# Patient Record
Sex: FEMALE | Race: WHITE | NOT HISPANIC OR LATINO | Employment: OTHER | URBAN - METROPOLITAN AREA
[De-identification: names, ages, dates, MRNs, and addresses within clinical notes are randomized per-mention and may not be internally consistent; named-entity substitution may affect disease eponyms.]

---

## 2022-03-16 ENCOUNTER — OFFICE VISIT (OUTPATIENT)
Dept: FAMILY MEDICINE CLINIC | Facility: CLINIC | Age: 27
End: 2022-03-16

## 2022-03-16 ENCOUNTER — PATIENT ROUNDING (BHMG ONLY) (OUTPATIENT)
Dept: FAMILY MEDICINE CLINIC | Facility: CLINIC | Age: 27
End: 2022-03-16

## 2022-03-16 ENCOUNTER — LAB (OUTPATIENT)
Dept: LAB | Facility: HOSPITAL | Age: 27
End: 2022-03-16

## 2022-03-16 VITALS
HEART RATE: 71 BPM | SYSTOLIC BLOOD PRESSURE: 126 MMHG | BODY MASS INDEX: 31.5 KG/M2 | DIASTOLIC BLOOD PRESSURE: 84 MMHG | WEIGHT: 220 LBS | OXYGEN SATURATION: 98 % | HEIGHT: 70 IN

## 2022-03-16 DIAGNOSIS — Z00.00 ENCOUNTER FOR MEDICAL EXAMINATION TO ESTABLISH CARE: ICD-10-CM

## 2022-03-16 DIAGNOSIS — G43.109 MIGRAINE WITH AURA AND WITHOUT STATUS MIGRAINOSUS, NOT INTRACTABLE: ICD-10-CM

## 2022-03-16 DIAGNOSIS — F90.9 ATTENTION DEFICIT HYPERACTIVITY DISORDER (ADHD), UNSPECIFIED ADHD TYPE: ICD-10-CM

## 2022-03-16 DIAGNOSIS — E66.9 OBESITY WITHOUT SERIOUS COMORBIDITY, UNSPECIFIED CLASSIFICATION, UNSPECIFIED OBESITY TYPE: ICD-10-CM

## 2022-03-16 DIAGNOSIS — F41.8 MIXED ANXIETY AND DEPRESSIVE DISORDER: ICD-10-CM

## 2022-03-16 DIAGNOSIS — Z87.42 HISTORY OF ABNORMAL CERVICAL PAP SMEAR: ICD-10-CM

## 2022-03-16 DIAGNOSIS — G95.0 SYRINGOHYDROMYELIA: ICD-10-CM

## 2022-03-16 DIAGNOSIS — Z00.00 ENCOUNTER FOR MEDICAL EXAMINATION TO ESTABLISH CARE: Primary | ICD-10-CM

## 2022-03-16 DIAGNOSIS — R35.0 URINARY FREQUENCY: ICD-10-CM

## 2022-03-16 PROBLEM — M51.24 HERNIATED THORACIC DISC WITHOUT MYELOPATHY: Status: ACTIVE | Noted: 2020-10-28

## 2022-03-16 PROBLEM — G43.909 MIGRAINE HEADACHE: Status: ACTIVE | Noted: 2017-12-01

## 2022-03-16 LAB
ALBUMIN SERPL-MCNC: 4.6 G/DL (ref 3.5–5.2)
ALBUMIN/GLOB SERPL: 1.7 G/DL
ALP SERPL-CCNC: 98 U/L (ref 39–117)
ALT SERPL W P-5'-P-CCNC: 31 U/L (ref 1–33)
ANION GAP SERPL CALCULATED.3IONS-SCNC: 11.1 MMOL/L (ref 5–15)
AST SERPL-CCNC: 25 U/L (ref 1–32)
BILIRUB SERPL-MCNC: 0.5 MG/DL (ref 0–1.2)
BUN SERPL-MCNC: 12 MG/DL (ref 6–20)
BUN/CREAT SERPL: 12.5 (ref 7–25)
CALCIUM SPEC-SCNC: 10 MG/DL (ref 8.6–10.5)
CHLORIDE SERPL-SCNC: 106 MMOL/L (ref 98–107)
CHOLEST SERPL-MCNC: 229 MG/DL (ref 0–200)
CO2 SERPL-SCNC: 18.9 MMOL/L (ref 22–29)
CREAT SERPL-MCNC: 0.96 MG/DL (ref 0.57–1)
DEPRECATED RDW RBC AUTO: 39 FL (ref 37–54)
EGFRCR SERPLBLD CKD-EPI 2021: 83.9 ML/MIN/1.73
ERYTHROCYTE [DISTWIDTH] IN BLOOD BY AUTOMATED COUNT: 12.1 % (ref 12.3–15.4)
GLOBULIN UR ELPH-MCNC: 2.7 GM/DL
GLUCOSE SERPL-MCNC: 79 MG/DL (ref 65–99)
HBA1C MFR BLD: 5.3 % (ref 4.8–5.6)
HCT VFR BLD AUTO: 47.3 % (ref 34–46.6)
HDLC SERPL QL: 5.2
HDLC SERPL-MCNC: 44 MG/DL (ref 40–60)
HGB BLD-MCNC: 15.6 G/DL (ref 12–15.9)
LDLC SERPL CALC-MCNC: 158 MG/DL (ref 0–100)
MCH RBC QN AUTO: 29.3 PG (ref 26.6–33)
MCHC RBC AUTO-ENTMCNC: 33 G/DL (ref 31.5–35.7)
MCV RBC AUTO: 88.9 FL (ref 79–97)
PLATELET # BLD AUTO: 428 10*3/MM3 (ref 140–450)
PMV BLD AUTO: 10.3 FL (ref 6–12)
POTASSIUM SERPL-SCNC: 4.7 MMOL/L (ref 3.5–5.2)
PROT SERPL-MCNC: 7.3 G/DL (ref 6–8.5)
RBC # BLD AUTO: 5.32 10*6/MM3 (ref 3.77–5.28)
SODIUM SERPL-SCNC: 136 MMOL/L (ref 136–145)
TRIGL SERPL-MCNC: 146 MG/DL (ref 0–150)
TSH SERPL DL<=0.05 MIU/L-ACNC: 1.57 UIU/ML (ref 0.27–4.2)
VLDLC SERPL-MCNC: 27 MG/DL (ref 5–40)
WBC NRBC COR # BLD: 11.94 10*3/MM3 (ref 3.4–10.8)

## 2022-03-16 PROCEDURE — 83036 HEMOGLOBIN GLYCOSYLATED A1C: CPT

## 2022-03-16 PROCEDURE — 99204 OFFICE O/P NEW MOD 45 MIN: CPT | Performed by: STUDENT IN AN ORGANIZED HEALTH CARE EDUCATION/TRAINING PROGRAM

## 2022-03-16 PROCEDURE — 36415 COLL VENOUS BLD VENIPUNCTURE: CPT

## 2022-03-16 PROCEDURE — 80050 GENERAL HEALTH PANEL: CPT

## 2022-03-16 PROCEDURE — 80061 LIPID PANEL: CPT

## 2022-03-16 RX ORDER — PROPRANOLOL HYDROCHLORIDE 10 MG/1
10 TABLET ORAL AS NEEDED
COMMUNITY
End: 2022-04-26

## 2022-03-16 RX ORDER — ESCITALOPRAM OXALATE 10 MG/1
10 TABLET ORAL DAILY
COMMUNITY
Start: 2022-02-17 | End: 2022-04-26

## 2022-03-16 RX ORDER — TOPIRAMATE 25 MG/1
25 TABLET ORAL DAILY
Qty: 30 TABLET | Refills: 2 | Status: SHIPPED | OUTPATIENT
Start: 2022-03-16 | End: 2022-04-26

## 2022-03-16 RX ORDER — BUPROPION HYDROCHLORIDE 300 MG/1
300 TABLET ORAL DAILY
COMMUNITY
Start: 2022-02-21 | End: 2022-04-26

## 2022-03-16 RX ORDER — METHYLPHENIDATE HYDROCHLORIDE 30 MG/1
30 CAPSULE, EXTENDED RELEASE ORAL DAILY
COMMUNITY
Start: 2022-02-16 | End: 2022-08-03

## 2022-03-16 RX ORDER — METHYLPHENIDATE HYDROCHLORIDE 20 MG/1
20 TABLET ORAL
COMMUNITY
End: 2022-04-26

## 2022-03-16 RX ORDER — TOPIRAMATE 50 MG/1
50 TABLET, FILM COATED ORAL 2 TIMES DAILY
COMMUNITY
Start: 2022-03-11 | End: 2022-04-26

## 2022-03-16 RX ORDER — FLUOXETINE HYDROCHLORIDE 40 MG/1
CAPSULE ORAL
COMMUNITY
End: 2022-03-16

## 2022-03-16 NOTE — PROGRESS NOTES
New Patient Office Visit      Patient Name: Kathleen Luers  : 1995   MRN: [id number]   Care Team: Patient Care Team:  Jimena Desai DO as PCP - General (Internal Medicine)    Chief Complaint:    Chief Complaint   Patient presents with   • Establish Care   • Migraine   • Urinary Frequency     Last couple nights    • Weight Gain     Gained 17 pounds in the last couple weeks       History of Present Illness: Kathleen Luers is a 26 y.o. female who is here today to establish care. She has history of ADHD, anxiety, depression, headaches, obesity.     ADHD - Follows with [facility], currently taking Ritalin 30mg daily and feels symptoms are well controlled    Urinary frequency - ongoing for about 6 months, notices more often at night time. Has had pregnancy test, UA, and cultures within the past week that have been normal at outside facilities.  Denies dysuria, changes in urine color, urine odor, nausea, vomiting, suprapubic discomfort.  She does admit to occasional urinary hesitancy and difficulty emptying at times.    Syringohydromyelia - diagnosed about 1 year ago, followed with  Neurology yearly. Asymptomatic.     Obesity - has struggled with weight gain over the past several years, seems to be worsening. Exercises regularly, 3x weekly and rides horses. She has been trying to make healthy choices, cooking at home, cutting out soda.     Migraines - ongoing since childhood. Currently taking Topiramate, has been on this for about 1 year. Currently has 2-3 migraines per month and feels her frequency is starting to increase.  Wants to increase topiramate..  Migraines alway start in posterior right sided and travels behind eye. Admits to aura, blurry vision, nausea, vomiting. NSAIDs and hydration improve episodes.     Anxiety/Depression - Wellbutrin, Lexapro, as needed propranolol, managed per behavioral health. Very happy with current mental health. Feels symptoms are very well controlled.      History of abnormal pap (LSIL 05/2021) - due for repeat in 05/2022    Family History  Maternal grandfather - colorectal cancer >50, lung cancer, brain cancer   Paternal Uncle - colorectal age 34  Mother - HTN  Father - heart murmur       Subjective      Review of Systems:   Review of Systems - See HPI    Past Medical History:   Past Medical History:   Diagnosis Date   • ADHD (attention deficit hyperactivity disorder)    • Anxiety    • Depression    • Headache    • Obesity        Past Surgical History:   Past Surgical History:   Procedure Laterality Date   • WISDOM TOOTH EXTRACTION         Family History:   Family History   Problem Relation Age of Onset   • Anxiety disorder Mother    • Asthma Mother    • Depression Mother    • Mental illness Mother    • Miscarriages / Stillbirths Mother    • Arthritis Maternal Grandfather    • Cancer Paternal Grandfather    • Heart disease Paternal Grandfather    • Hyperlipidemia Paternal Grandfather    • Hypertension Paternal Grandfather    • Alcohol abuse Maternal Aunt    • Drug abuse Maternal Aunt    • Cancer Paternal Uncle    • Cancer Maternal Aunt        Social History:   Social History     Socioeconomic History   • Marital status: Single   Tobacco Use   • Smoking status: Never Smoker   • Smokeless tobacco: Never Used   Substance and Sexual Activity   • Alcohol use: Yes     Comment: Very occasionally. Not weekly   • Drug use: Never   • Sexual activity: Yes     Partners: Male     Birth control/protection: Injection       Tobacco History:   Social History     Tobacco Use   Smoking Status Never Smoker   Smokeless Tobacco Never Used       Medications:     Current Outpatient Medications:   •  buPROPion XL (WELLBUTRIN XL) 300 MG 24 hr tablet, Take 300 mg by mouth Daily., Disp: , Rfl:   •  escitalopram (LEXAPRO) 10 MG tablet, Take 10 mg by mouth Daily., Disp: , Rfl:   •  methylphenidate (RITALIN) 20 MG tablet, Take 20 mg by mouth. 1 tablet in the afternoon, Disp: , Rfl:   •   "methylphenidate LA (RITALIN LA) 30 MG 24 hr capsule, Take 30 mg by mouth Daily, Disp: , Rfl:   •  propranolol (INDERAL) 10 MG tablet, Take 10 mg by mouth As Needed., Disp: , Rfl:   •  topiramate (TOPAMAX) 50 MG tablet, Take 50 mg by mouth 2 (Two) Times a Day., Disp: , Rfl:   •  topiramate (TOPAMAX) 25 MG tablet, Take 1 tablet by mouth Daily. To be taken in addition to topiramate 50 mg twice daily for for total dose of 75 mg in the morning and 50 mg in the evening., Disp: 30 tablet, Rfl: 2    Allergies:   No Known Allergies    Objective     Physical Exam:  Vital Signs:   Vitals:    03/16/22 1010   BP: 126/84   Pulse: 71   SpO2: 98%   Weight: 99.8 kg (220 lb)   Height: 177.8 cm (70\")     Body mass index is 31.57 kg/m².     Physical Exam  Vitals reviewed.   Constitutional:       Appearance: Normal appearance. She is obese.   Neck:      Comments: Thyroid is normal size, no nodules or tenderness  Cardiovascular:      Rate and Rhythm: Normal rate and regular rhythm.      Pulses: Normal pulses.      Heart sounds: Normal heart sounds. No murmur heard.  Pulmonary:      Effort: Pulmonary effort is normal. No respiratory distress.      Breath sounds: Normal breath sounds. No wheezing.   Abdominal:      General: There is no distension.      Palpations: Abdomen is soft.      Tenderness: There is no abdominal tenderness.   Skin:     General: Skin is warm and dry.   Neurological:      Mental Status: She is alert.   Psychiatric:         Mood and Affect: Mood normal.         Behavior: Behavior normal.         Judgment: Judgment normal.         Assessment / Plan      Assessment/Plan:   Problems Addressed This Visit  Diagnoses and all orders for this visit:    1. Encounter for medical examination to establish care (Primary)  -     TSH Rfx On Abnormal To Free T4; Future  -     Comprehensive metabolic panel; Future  -     Hemoglobin A1c; Future  -     Lipid Panel w/ Chol/HDL Ratio; Future  -     CBC No Differential; Future    Has " history of abnormal Pap smears in May 2021, due for repeat in May 2022.      2. Mixed anxiety and depressive disorder  -     TSH Rfx On Abnormal To Free T4; Future  -     Comprehensive metabolic panel; Future  -     Lipid Panel w/ Chol/HDL Ratio; Future  -     CBC No Differential; Future    Follow with behavioral health.  Well-controlled with current Lexapro and Wellbutrin, managed per behavioral health    3. Urinary frequency  -     POC Urinalysis Dipstick, Automated  -     Ambulatory Referral to Urology    Ongoing for several months.  She presents with recurrence of normal UA and cultures from previous providers over the past months.  She request referral to urology, placed today.  We also discussed the importance of limiting nighttime fluid intake, caffeine.    4. Obesity without serious comorbidity, unspecified classification, unspecified obesity type  -     TSH Rfx On Abnormal To Free T4; Future  -     Comprehensive metabolic panel; Future  -     Hemoglobin A1c; Future  -     Lipid Panel w/ Chol/HDL Ratio; Future  -     CBC No Differential; Future    Will obtain labs to evaluate for metabolic dysfunction.  Counseled patient on importance of weight loss and maintaining healthy lifestyle. Recommended calorie deficit and 150 minutes of exercise per week.  Check discussed trial of intermittent fasting.  Recommended using my fitness pal for calorie counting.    5. Migraine with aura and without status migrainosus, not intractable  -     topiramate (TOPAMAX) 25 MG tablet; Take 1 tablet by mouth Daily. To be taken in addition to topiramate 50 mg twice daily for for total dose of 75 mg in the morning and 50 mg in the evening.  Dispense: 30 tablet; Refill: 2    Seems to be worsening in frequency.  Will increase topiramate to 75 mg in the morning and 50 mg in the evening.  Counseled on importance of hydration, healthy sleep hygiene habits, and optimization of mental health.    6. Attention deficit hyperactivity disorder  (ADHD), unspecified ADHD type  Will follow with behavioral health.  Well-controlled with Ritalin 30 mg daily    7. Syringohydromyelia (HCC)  Asymptomatic.  Follows with [facility] annually        Plan of care reviewed with patient at the conclusion of today's visit. Education was provided regarding diagnosis and management.  Patient verbalizes understanding of and agreement with management plan.      Follow Up:   Return for 4-6 weeks for weight , Recheck.          DO LASHANDA Oneil RD  [facility]  [address]  formerly [facility] [phone]  Fax [phone]  Phone [phone]

## 2022-03-28 ENCOUNTER — PATIENT MESSAGE (OUTPATIENT)
Dept: FAMILY MEDICINE CLINIC | Facility: CLINIC | Age: 27
End: 2022-03-28

## 2022-03-28 DIAGNOSIS — R35.0 URINARY FREQUENCY: Primary | ICD-10-CM

## 2022-03-29 RX ORDER — OXYBUTYNIN CHLORIDE 5 MG/1
5 TABLET, EXTENDED RELEASE ORAL DAILY
Qty: 30 TABLET | Refills: 0 | Status: SHIPPED | OUTPATIENT
Start: 2022-03-29 | End: 2022-04-26

## 2022-04-26 ENCOUNTER — LAB (OUTPATIENT)
Dept: LAB | Facility: HOSPITAL | Age: 27
End: 2022-04-26

## 2022-04-26 ENCOUNTER — OFFICE VISIT (OUTPATIENT)
Dept: UROLOGY | Facility: CLINIC | Age: 27
End: 2022-04-26

## 2022-04-26 VITALS — WEIGHT: 220 LBS | HEIGHT: 70 IN | BODY MASS INDEX: 31.5 KG/M2

## 2022-04-26 DIAGNOSIS — N32.81 OVERACTIVE BLADDER: ICD-10-CM

## 2022-04-26 DIAGNOSIS — R35.0 FREQUENCY OF URINATION: Primary | ICD-10-CM

## 2022-04-26 DIAGNOSIS — R35.0 FREQUENCY OF URINATION: ICD-10-CM

## 2022-04-26 LAB
BILIRUB BLD-MCNC: NEGATIVE MG/DL
CLARITY, POC: CLEAR
COLOR UR: YELLOW
EXPIRATION DATE: NORMAL
GLUCOSE UR STRIP-MCNC: NEGATIVE MG/DL
KETONES UR QL: NEGATIVE
LEUKOCYTE EST, POC: NEGATIVE
Lab: NORMAL
NITRITE UR-MCNC: NEGATIVE MG/ML
PH UR: 5.5 [PH] (ref 5–8)
PROT UR STRIP-MCNC: NEGATIVE MG/DL
RBC # UR STRIP: NEGATIVE /UL
SP GR UR: 1.01 (ref 1–1.03)
UROBILINOGEN UR QL: NORMAL

## 2022-04-26 PROCEDURE — 81003 URINALYSIS AUTO W/O SCOPE: CPT | Performed by: STUDENT IN AN ORGANIZED HEALTH CARE EDUCATION/TRAINING PROGRAM

## 2022-04-26 PROCEDURE — 99204 OFFICE O/P NEW MOD 45 MIN: CPT | Performed by: STUDENT IN AN ORGANIZED HEALTH CARE EDUCATION/TRAINING PROGRAM

## 2022-04-26 PROCEDURE — 87109 MYCOPLASMA: CPT

## 2022-04-26 PROCEDURE — 51798 US URINE CAPACITY MEASURE: CPT | Performed by: STUDENT IN AN ORGANIZED HEALTH CARE EDUCATION/TRAINING PROGRAM

## 2022-04-26 NOTE — PROGRESS NOTES
[facility] Female Office Visit      Patient Name: Kathleen Luers  : 1995   MRN: [id number]     Chief Complaint:  Lower Urinary Tract Symptoms.   Chief Complaint   Patient presents with   • Frequency   • New Patient        Referring Provider: Jimena Desai DO    History of Present Illness: Mr. Luers is a 26 y.o. female with history lower urinary tract symptoms presents to [facility] care. Patient has syringohydromyelia  from T3-T12. Has a history of sciatica in her left leg that was recurrent, and eventually spinal MRI demonstrated this finding, likely something from birth or related to fall from horses (patient rides horses).  Patient also has a history of anxiety and ADHD managed with Ritalin, Lexapro and Wellbutrin.    Has had piriformis injections in the past for L sided sciatica, numbness and burning throughout left leg.     Primary symptom of bother is urgency and frequency.  She states she will often urinate, and feel like she needs to run right back to the bathroom.  She states she has a preserved strength of urinary stream.  She states this is made worse with intercourse.    Patient is reporting nocturia 1-2 times a night.    1 cup of coffee in AM, drinks water throughout day. Cuts off water off at 6-7 PM to prevent nocturia.     Denies gross hematuria.  Denies nephrolithiasis.  No significant urinary tract infection history.    Patient is sexually active and is mongamous.  No prior sexual transmitted infections.    PVR 0 mL today.  Urinalysis negative today.    She has previously been prescribed 5 mg as well as 10 mg oxybutynin extended release tablets with minimal improvement in her symptoms.      Subjective      Review of System: Review of Systems   Constitutional: Negative.  Negative for chills, fatigue, fever and unexpected weight change.   HENT: Negative.  Negative for sore throat.    Eyes: Negative.  Negative for visual disturbance.   Respiratory: Negative.  Negative for cough, chest  tightness and shortness of breath.    Cardiovascular: Negative.  Negative for chest pain and leg swelling.   Gastrointestinal: Negative.  Negative for blood in stool, constipation, diarrhea, nausea, rectal pain and vomiting.   Genitourinary: Negative.  Negative for decreased urine volume, difficulty urinating, dysuria, enuresis, flank pain, frequency, genital sores, hematuria and urgency.   Musculoskeletal: Negative.  Negative for back pain and joint swelling.   Skin: Negative.  Negative for rash and wound.   Neurological: Negative.  Negative for seizures, speech difficulty, weakness and headaches.   Psychiatric/Behavioral: Negative.  Negative for confusion, sleep disturbance and suicidal ideas. The patient is not nervous/anxious.       I have reviewed the ROS documented by my clinical staff, I have updated appropriately and I agree. Leonard Nicole MD    Past Medical History:  Past Medical History:   Diagnosis Date   • ADHD (attention deficit hyperactivity disorder)    • Anxiety    • Depression    • Headache    • Obesity        Past Surgical History:  Past Surgical History:   Procedure Laterality Date   • WISDOM TOOTH EXTRACTION         Medications:    Current Outpatient Medications:   •  buPROPion XL (WELLBUTRIN XL) 300 MG 24 hr tablet, Take 1 tablet by mouth Daily., Disp: 30 tablet, Rfl: 5  •  escitalopram (LEXAPRO) 10 MG tablet, Take 1 tablet by mouth Daily., Disp: 30 tablet, Rfl: 5  •  methylphenidate (Ritalin) 20 MG tablet, Take 1 tablet by mouth Daily., Disp: 30 tablet, Rfl: 0  •  methylphenidate LA (RITALIN LA) 30 MG 24 hr capsule, Take 30 mg by mouth Daily, Disp: , Rfl:   •  Mirabegron ER (MYRBETRIQ) 50 MG tablet sustained-release 24 hour 24 hr tablet, Take 50 mg by mouth Daily., Disp: 30 tablet, Rfl: 0  •  propranolol (INDERAL) 10 MG tablet, Take 1 tablet by mouth 3 (Three) Times a Day As Needed., Disp: 90 tablet, Rfl: 5  •  topiramate (TOPAMAX) 50 MG tablet, Take 50 mg by mouth 2 (Two) Times a Day.,  "Disp: , Rfl:     Allergies:  No Known Allergies    Social History:  Social History     Socioeconomic History   • Marital status: Single   Tobacco Use   • Smoking status: Never Smoker   • Smokeless tobacco: Never Used   Substance and Sexual Activity   • Alcohol use: Yes     Comment: Very occasionally. Not weekly   • Drug use: Never   • Sexual activity: Yes     Partners: Male     Birth control/protection: Injection       Family History:  Family History   Problem Relation Age of Onset   • Anxiety disorder Mother    • Asthma Mother    • Depression Mother    • Mental illness Mother    • Miscarriages / Stillbirths Mother    • Arthritis Maternal Grandfather    • Cancer Paternal Grandfather    • Heart disease Paternal Grandfather    • Hyperlipidemia Paternal Grandfather    • Hypertension Paternal Grandfather    • Alcohol abuse Maternal Aunt    • Drug abuse Maternal Aunt    • Cancer Paternal Uncle    • Cancer Maternal Aunt          Post void residual bladder scan:    00mL     Objective     Physical Exam:   Vital Signs:   Vitals:    04/26/22 0951   Weight: 99.8 kg (220 lb)   Height: 177.8 cm (70\")   PainSc: 0-No pain     Body mass index is 31.57 kg/m².     Physical Exam  Vitals and nursing note reviewed. Exam conducted with a chaperone present.   Constitutional:       Appearance: Normal appearance.   HENT:      Head: Normocephalic and atraumatic.      Mouth/Throat:      Mouth: Mucous membranes are moist.      Pharynx: Oropharynx is clear.   Eyes:      Extraocular Movements: Extraocular movements intact.      Conjunctiva/sclera: Conjunctivae normal.   Cardiovascular:      Rate and Rhythm: Normal rate and regular rhythm.   Pulmonary:      Effort: Pulmonary effort is normal. No respiratory distress.   Abdominal:      Palpations: Abdomen is soft.      Tenderness: There is no abdominal tenderness. There is no right CVA tenderness or left CVA tenderness.   Musculoskeletal:         General: Normal range of motion.      Cervical " back: Normal range of motion.   Skin:     General: Skin is warm and dry.   Neurological:      General: No focal deficit present.      Mental Status: She is alert and oriented to person, place, and time.   Psychiatric:         Mood and Affect: Mood normal.         Behavior: Behavior normal.         Labs:   Brief Urine Lab Results  (Last result in the past 365 days)      Color   Clarity   Blood   Leuk Est   Nitrite   Protein   CREAT   Urine HCG        04/26/22 0954 Yellow   Clear   Negative   Negative   Negative   Negative                      Lab Results   Component Value Date    GLUCOSE 79 03/16/2022    CALCIUM 10.0 03/16/2022     03/16/2022    K 4.7 03/16/2022    CO2 18.9 (L) 03/16/2022     03/16/2022    BUN 12 03/16/2022    CREATININE 0.96 03/16/2022    BCR 12.5 03/16/2022    ANIONGAP 11.1 03/16/2022       Lab Results   Component Value Date    WBC 11.94 (H) 03/16/2022    HGB 15.6 03/16/2022    HCT 47.3 (H) 03/16/2022    MCV 88.9 03/16/2022     03/16/2022       Images:   No Images in the past 120 days found..    Measures:   Tobacco:   Kathleen Luers  reports that she has never smoked. She has never used smokeless tobacco..          Urine Incontinence: (NOUI)  Patient reports that she is not currently experiencing any symptoms of urinary incontinence.      Assessment / Plan      Assessment:  Mrs. Luers is a 26 y.o. female who presented today with lower urinary tract symptoms.  Primary bother is urgency, frequency and sensation of incomplete emptying.  This is consistent with overactive bladder.  Discussed there is a chance that her thoracic spinal findings including likely syringomyelia seal could be contributing as well as her history of sciatica.  She has very few bladder irritating substances in her diet.  We will go ahead and treat her with medications.  She has previously tried and failed 10 mg oxybutynin extended release tablet.  We will start her on mirabegron 50 mg tablets daily.  She will  see me back in 3 months for reassessment.  I gave her a month of samples today, she will reach out to me if she would like to continue this medication, we discussed that this could be possibly more expensive than anticholinergics however has fewer side effects including no dry, no dry mouth or risk of constipation.  Discussed the small increased risk of hypertension related to this medication.    We will send her urine off for mycoplasma and Ureaplasma to make sure she does not have an atypical organism driving her urinary symptoms.    Diagnoses and all orders for this visit:    1. Frequency of urination (Primary)  -     POC Urinalysis Dipstick, Automated  -     Mycoplasma / Ureaplasma Culture - Urine, Urine, Clean Catch; Future    2. Overactive bladder  -     Mycoplasma / Ureaplasma Culture - Urine, Urine, Clean Catch; Future  -     Mirabegron ER (MYRBETRIQ) 50 MG tablet sustained-release 24 hour 24 hr tablet; Take 50 mg by mouth Daily.  Dispense: 30 tablet; Refill: 0           Follow Up:   Return in about 3 months (around 7/26/2022).    I spent approximately 45 minutes providing clinical care for this patient; including review of patient's chart and provider documentation, face to face time spent with patient in examination room (obtaining history, performing physical exam, discussing diagnosis and management options), placing orders, and completing patient documentation.     Leonard Nicole MD  [facility] [place]

## 2022-04-27 ENCOUNTER — PATIENT ROUNDING (BHMG ONLY) (OUTPATIENT)
Dept: UROLOGY | Facility: CLINIC | Age: 27
End: 2022-04-27

## 2022-04-27 NOTE — PROGRESS NOTES
A go2 media message has been sent to the patient for PATIENT ROUNDING with [facility].

## 2022-05-02 ENCOUNTER — TELEPHONE (OUTPATIENT)
Dept: UROLOGY | Facility: CLINIC | Age: 27
End: 2022-05-02

## 2022-05-02 DIAGNOSIS — A49.3 NGU DUE TO UREAPLASMA UREALYTICUM: Primary | ICD-10-CM

## 2022-05-02 DIAGNOSIS — N34.1 NGU DUE TO UREAPLASMA UREALYTICUM: Primary | ICD-10-CM

## 2022-05-02 RX ORDER — DOXYCYCLINE HYCLATE 100 MG/1
100 CAPSULE ORAL 2 TIMES DAILY
Qty: 28 CAPSULE | Refills: 0 | Status: SHIPPED | OUTPATIENT
Start: 2022-05-02 | End: 2022-05-16

## 2022-05-02 NOTE — TELEPHONE ENCOUNTER
I attempted to call the patient back with the results of her urine culture which resulted positive for Ureaplasma.  I was unable to reach the patient x2, left her voicemail indicating that we will treat her with 2-week course of doxycycline 100 mg twice daily to clear her urine as best as possible.  Discussed that mycoplasma/Ureaplasma are typically colonizing bacteria that usually do not cause symptoms but when they are present on culture and symptoms are present, we will attempt to eradicate them from the urinary tract.  I discussed that this is not a sexually transmitted infection.  I asked her to give me a call if she has any further concerns.      Leonard Nicole MD

## 2022-05-02 NOTE — TELEPHONE ENCOUNTER
----- Message from Leonard Nicole MD sent at 5/2/2022  3:29 PM EDT -----  Sophie, I try to call this patient twice and left her voicemail.  Can you try to reach out to her and let her know that her urine was positive for a bacteria called Ureaplasma and I sent her 2 weeks of doxycycline to her pharmacy to treat this.

## 2022-05-04 LAB
M HOMINIS SPEC QL CULT: NEGATIVE
U UREALYTICUM SPEC QL CULT: POSITIVE

## 2022-05-24 DIAGNOSIS — A49.3 NGU DUE TO UREAPLASMA UREALYTICUM: Primary | ICD-10-CM

## 2022-05-24 DIAGNOSIS — N34.1 NGU DUE TO UREAPLASMA UREALYTICUM: Primary | ICD-10-CM

## 2022-05-24 RX ORDER — LEVOFLOXACIN 500 MG/1
500 TABLET, FILM COATED ORAL DAILY
Qty: 7 TABLET | Refills: 0 | Status: SHIPPED | OUTPATIENT
Start: 2022-05-24 | End: 2022-06-01

## 2022-06-04 ENCOUNTER — PATIENT MESSAGE (OUTPATIENT)
Dept: FAMILY MEDICINE CLINIC | Facility: CLINIC | Age: 27
End: 2022-06-04

## 2022-06-04 DIAGNOSIS — G43.109 MIGRAINE WITH AURA AND WITHOUT STATUS MIGRAINOSUS, NOT INTRACTABLE: Primary | ICD-10-CM

## 2022-06-06 RX ORDER — TOPIRAMATE 25 MG/1
75 TABLET ORAL EVERY MORNING
Qty: 90 TABLET | Refills: 5 | Status: SHIPPED | OUTPATIENT
Start: 2022-06-06 | End: 2022-09-30

## 2022-06-06 RX ORDER — TOPIRAMATE 50 MG/1
50 TABLET, FILM COATED ORAL
Qty: 30 TABLET | Refills: 5 | Status: SHIPPED | OUTPATIENT
Start: 2022-06-06 | End: 2022-09-30

## 2022-06-08 ENCOUNTER — PATIENT MESSAGE (OUTPATIENT)
Dept: FAMILY MEDICINE CLINIC | Facility: CLINIC | Age: 27
End: 2022-06-08

## 2022-06-08 ENCOUNTER — CLINICAL SUPPORT (OUTPATIENT)
Dept: FAMILY MEDICINE CLINIC | Facility: CLINIC | Age: 27
End: 2022-06-08

## 2022-06-08 DIAGNOSIS — Z01.419 ROUTINE GYNECOLOGICAL EXAMINATION: Primary | ICD-10-CM

## 2022-06-08 DIAGNOSIS — Z87.42 HISTORY OF ABNORMAL CERVICAL PAP SMEAR: ICD-10-CM

## 2022-06-08 DIAGNOSIS — Z30.42 ENCOUNTER FOR SURVEILLANCE OF INJECTABLE CONTRACEPTIVE: ICD-10-CM

## 2022-06-08 LAB
B-HCG UR QL: NEGATIVE
EXPIRATION DATE: NORMAL
INTERNAL NEGATIVE CONTROL: NEGATIVE
INTERNAL POSITIVE CONTROL: POSITIVE
Lab: NORMAL

## 2022-06-08 PROCEDURE — 81025 URINE PREGNANCY TEST: CPT | Performed by: STUDENT IN AN ORGANIZED HEALTH CARE EDUCATION/TRAINING PROGRAM

## 2022-06-08 RX ORDER — MEDROXYPROGESTERONE ACETATE 150 MG/ML
150 INJECTION, SUSPENSION INTRAMUSCULAR
Qty: 1 ML | Refills: 1 | Status: SHIPPED | OUTPATIENT
Start: 2022-06-08 | End: 2022-12-06 | Stop reason: SDUPTHER

## 2022-06-29 ENCOUNTER — TELEMEDICINE (OUTPATIENT)
Dept: FAMILY MEDICINE CLINIC | Facility: TELEHEALTH | Age: 27
End: 2022-06-29

## 2022-06-29 DIAGNOSIS — J32.9 SINUSITIS, UNSPECIFIED CHRONICITY, UNSPECIFIED LOCATION: Primary | ICD-10-CM

## 2022-06-29 PROCEDURE — 99213 OFFICE O/P EST LOW 20 MIN: CPT | Performed by: NURSE PRACTITIONER

## 2022-06-29 RX ORDER — AMOXICILLIN AND CLAVULANATE POTASSIUM 875; 125 MG/1; MG/1
1 TABLET, FILM COATED ORAL 2 TIMES DAILY
Qty: 20 TABLET | Refills: 0 | Status: SHIPPED | OUTPATIENT
Start: 2022-06-29 | End: 2022-08-03

## 2022-06-29 RX ORDER — METHYLPREDNISOLONE 4 MG/1
TABLET ORAL
Qty: 21 TABLET | Refills: 0 | Status: SHIPPED | OUTPATIENT
Start: 2022-06-29 | End: 2022-08-03

## 2022-06-29 NOTE — PROGRESS NOTES
You have chosen to receive care through a telehealth visit.  Do you consent to use a video/audio connection for your medical care today? Yes     CHIEF COMPLAINT  Chief Complaint   Patient presents with   • Sinus Problem         HPI  Kathleen Luers is a 26 y.o. female  presents with complaint of 7 day h/o sinus congestion and pressure with nasal discharge yellow and thick and bilateral ear pain. She is using Flonase nasal spray and Mucinex without relief. She had Covid 19 the middle of June but these symptoms have lingered and she feels has evolved into a sinus infection.     Review of Systems   Constitutional: Positive for activity change and fatigue. Negative for fever.   HENT: Positive for congestion, ear pain (bilaterally), postnasal drip, rhinorrhea, sinus pressure and sinus pain.    Respiratory: Positive for cough.    Cardiovascular: Negative.    Gastrointestinal: Negative.    Musculoskeletal: Negative.    Skin: Negative.    Neurological: Positive for headaches.   Hematological: Negative.    Psychiatric/Behavioral: Negative.        Past Medical History:   Diagnosis Date   • ADHD (attention deficit hyperactivity disorder)    • Anxiety    • Depression    • Headache    • Obesity        Family History   Problem Relation Age of Onset   • Anxiety disorder Mother    • Asthma Mother    • Depression Mother    • Mental illness Mother    • Miscarriages / Stillbirths Mother    • Arthritis Maternal Grandfather    • Cancer Paternal Grandfather    • Heart disease Paternal Grandfather    • Hyperlipidemia Paternal Grandfather    • Hypertension Paternal Grandfather    • Alcohol abuse Maternal Aunt    • Drug abuse Maternal Aunt    • Cancer Paternal Uncle    • Cancer Maternal Aunt        Social History     Socioeconomic History   • Marital status: Single   Tobacco Use   • Smoking status: Never Smoker   • Smokeless tobacco: Never Used   Substance and Sexual Activity   • Alcohol use: Yes     Comment: Very occasionally. Not weekly    • Drug use: Never   • Sexual activity: Yes     Partners: Male     Birth control/protection: Injection       Kathleen Luers  reports that she has never smoked. She has never used smokeless tobacco..    There were no vitals taken for this visit.    PHYSICAL EXAM  Physical Exam   Constitutional: She is oriented to person, place, and time. She appears well-developed and well-nourished. She does not have a sickly appearance. She does not appear ill. No distress.   HENT:   Head: Normocephalic and atraumatic.   Nose: Mucosal edema and congestion present. Right sinus exhibits maxillary sinus tenderness and frontal sinus tenderness. Left sinus exhibits maxillary sinus tenderness and frontal sinus tenderness. nasal tenderness present.  Pulmonary/Chest: Effort normal.   Neurological: She is alert and oriented to person, place, and time.   Psychiatric: She has a normal mood and affect.   Vitals reviewed.      Results for orders placed or performed in visit on 06/08/22   POCT pregnancy, urine    Specimen: Urine   Result Value Ref Range    HCG, Urine, QL Negative Negative    Lot Number [id number]     Internal Positive Control Positive Positive, Passed    Internal Negative Control Negative Negative, Passed    Expiration Date 07/31/2023        Diagnoses and all orders for this visit:    1. Sinusitis, unspecified chronicity, unspecified location (Primary)  -     amoxicillin-clavulanate (Augmentin) 875-125 MG per tablet; Take 1 tablet by mouth 2 (Two) Times a Day.  Dispense: 20 tablet; Refill: 0  -     methylPREDNISolone (MEDROL) 4 MG dose pack; Take as directed on package instructions.  Dispense: 21 tablet; Refill: 0    Flonase as directed prn nasal congestion.   Sudafed as directed prn sinus congestion.   Mucinex DM as directed prn cough and congestion.   Fluids and rest      FOLLOW-UP  As discussed during visit with PCP/[facility] if no improvement or Urgent Care/Emergency Department if worsening of symptoms    Patient  verbalizes understanding of medication dosage, comfort measures, instructions for treatment and follow-up.    Cassandra Lai, COMFORT  06/29/2022  14:48 EDT    The use of a video visit has been reviewed with the patient and verbal informed consent has been obtained. Myself and Kathleen Luers participated in this visit. The patient is located in [address] [place] [address].    I am located in [place], [place]. Mychart and Zoom were utilized. I spent  15  minutes in the patient's chart for this visit.

## 2022-07-01 ENCOUNTER — OFFICE VISIT (OUTPATIENT)
Dept: FAMILY MEDICINE CLINIC | Facility: CLINIC | Age: 27
End: 2022-07-01

## 2022-07-01 VITALS
HEART RATE: 80 BPM | DIASTOLIC BLOOD PRESSURE: 72 MMHG | BODY MASS INDEX: 31.58 KG/M2 | HEIGHT: 70 IN | OXYGEN SATURATION: 98 % | SYSTOLIC BLOOD PRESSURE: 128 MMHG | WEIGHT: 220.6 LBS

## 2022-07-01 DIAGNOSIS — G47.19 EXCESSIVE DAYTIME SLEEPINESS: ICD-10-CM

## 2022-07-01 DIAGNOSIS — G89.29 CHRONIC BILATERAL THORACIC BACK PAIN: Primary | ICD-10-CM

## 2022-07-01 DIAGNOSIS — M54.2 CERVICALGIA: ICD-10-CM

## 2022-07-01 DIAGNOSIS — M54.6 CHRONIC BILATERAL THORACIC BACK PAIN: Primary | ICD-10-CM

## 2022-07-01 DIAGNOSIS — R06.83 SNORING: ICD-10-CM

## 2022-07-01 DIAGNOSIS — N62 LARGE BREASTS: ICD-10-CM

## 2022-07-01 PROCEDURE — 99214 OFFICE O/P EST MOD 30 MIN: CPT | Performed by: STUDENT IN AN ORGANIZED HEALTH CARE EDUCATION/TRAINING PROGRAM

## 2022-07-01 NOTE — PROGRESS NOTES
Established Office Visit      Patient Name: Kathleen Luers  : 1995   MRN: [id number]   Care Team: Patient Care Team:  Jimena Desai DO as PCP - General (Internal Medicine)    Chief Complaint:    Chief Complaint   Patient presents with   • Breast Reduction     Pt is wanting to discuss breast reduction       History of Present Illness: Kathleen Luers is a 26 y.o. female who is here today to discuss breast reduction, back pain.    She has been struggling with chronic upper back pain due to large breasts for several years. Feels it interferes with her posture and causea chronic upper neck/back pain. She has tried wide strapped bras, NSAIDs without relief. She has participated in physical therapy for upper and lower back without relief. She is interested in reduction.     She also complains of snoring at night, daytime fatigue, frequent day time nap, falls asleep while watching TV/movies. She is interested in sleep study.    Subjective      Review of Systems:   Review of Systems - See HPI    I have reviewed and the following portions of the patient's history were updated as appropriate: past family history, past medical history, past social history, past surgical history and problem list.    Medications:     Current Outpatient Medications:   •  amoxicillin-clavulanate (Augmentin) 875-125 MG per tablet, Take 1 tablet by mouth 2 (Two) Times a Day., Disp: 20 tablet, Rfl: 0  •  buPROPion XL (FORFIVO XL) 450 MG 24 hr tablet, Take 1 tablet by mouth Every Morning., Disp: 30 tablet, Rfl: 6  •  buPROPion XL (Wellbutrin XL) 150 MG 24 hr tablet, Take 1 tablet by mouth Daily with 300mg tablet, Disp: 90 tablet, Rfl: 0  •  buPROPion XL (Wellbutrin XL) 150 MG 24 hr tablet, Take 1 tablet by mouth Daily. (with Wellbutrin XL 300mg QD; total dose 450mg QD), Disp: 90 tablet, Rfl: 0  •  buPROPion XL (WELLBUTRIN XL) 300 MG 24 hr tablet, Take 1 tablet by mouth Daily., Disp: 30 tablet, Rfl: 5  •  escitalopram (LEXAPRO) 10 MG  "tablet, Take 1 tablet by mouth Daily., Disp: 30 tablet, Rfl: 5  •  medroxyPROGESTERone Acetate 150 MG/ML suspension prefilled syringe, Inject 1 mL into the appropriate muscle as directed by prescriber Every 3 (Three) Months., Disp: 1 mL, Rfl: 1  •  methylphenidate (Ritalin) 20 MG tablet, Take 1 tablet by mouth Every Afternoon., Disp: 30 tablet, Rfl: 0  •  methylphenidate (Ritalin) 20 MG tablet, Take 1 oral tablet every afternoon., Disp: 30 tablet, Rfl: 0  •  methylphenidate LA (RITALIN LA) 30 MG 24 hr capsule, Take 30 mg by mouth Daily, Disp: , Rfl:   •  methylphenidate LA (Ritalin LA) 30 MG 24 hr capsule, Take 1 capsule by mouth Every Morning, Disp: 30 capsule, Rfl: 0  •  methylPREDNISolone (MEDROL) 4 MG dose pack, Take as directed on package instructions., Disp: 21 tablet, Rfl: 0  •  ondansetron ODT (ZOFRAN-ODT) 4 MG disintegrating tablet, Place 1 tablet under the tongue Every 6 (Six) Hours As Needed., Disp: 20 tablet, Rfl: 0  •  propranolol (INDERAL) 10 MG tablet, Take 1 tablet by mouth 3 (Three) Times a Day As Needed., Disp: 90 tablet, Rfl: 5  •  Semaglutide,0.25 or 0.5MG/DOS, (Ozempic, 0.25 or 0.5 MG/DOSE,) 2 MG/1.5ML solution pen-injector, Inject 0.25 mg under the skin into the appropriate area as directed 1 (One) Time Per Week., Disp: 1.5 mL, Rfl: 0  •  topiramate (Topamax) 25 MG tablet, Take 3 tablets by mouth Every Morning., Disp: 90 tablet, Rfl: 5  •  topiramate (TOPAMAX) 50 MG tablet, Take 1 tablet by mouth every night at bedtime., Disp: 30 tablet, Rfl: 5    Allergies:   No Known Allergies    Objective     Physical Exam:  Vital Signs:   Vitals:    07/01/22 1506   BP: 128/72   Pulse: 80   SpO2: 98%   Weight: 100 kg (220 lb 9.6 oz)   Height: 177.8 cm (70\")     Body mass index is 31.65 kg/m².     Physical Exam  Vitals reviewed.   Constitutional:       Appearance: Normal appearance.   Cardiovascular:      Rate and Rhythm: Normal rate.   Pulmonary:      Effort: Pulmonary effort is normal. No respiratory " distress.   Chest:      Comments: Large breasts bilaterally   Musculoskeletal:      Cervical back: Normal range of motion and neck supple.   Skin:     General: Skin is warm and dry.   Neurological:      Mental Status: She is alert.   Psychiatric:         Mood and Affect: Mood normal.         Behavior: Behavior normal.         Judgment: Judgment normal.         Assessment / Plan      Assessment/Plan:   Problems Addressed This Visit  Diagnoses and all orders for this visit:    1. Chronic bilateral thoracic back pain (Primary)  2. Cervicalgia  3. Large breasts  -     Ambulatory Referral to Plastic Surgery  Neck and thoracic back pain 2/2 large breasts. Ongoing for years, persistent despite adherence to wide strapped bras, PT, weight loss, and NSAIDs. Interested in breast reduction, referred today.    4. Excessive daytime sleepiness  -     Ambulatory Referral to Sleep Medicine  Interested in sleep study, referred for PAU evaluation.    5. Snoring  -     Ambulatory Referral to Sleep Medicine          Plan of care reviewed with patient at the conclusion of today's visit. Education was provided regarding diagnosis and management.  Patient verbalizes understanding of and agreement with management plan.    Follow Up:   Return in about 3 months (around 10/1/2022) for Annual with pap .        DO LASHANDA Oneil RD  [facility]  [address]  [facility] [address]  Fax [phone]  Phone [phone]

## 2022-07-26 ENCOUNTER — TELEPHONE (OUTPATIENT)
Dept: UROLOGY | Facility: CLINIC | Age: 27
End: 2022-07-26

## 2022-07-26 NOTE — TELEPHONE ENCOUNTER
Provider: DR. SERRATO  Caller: KATHLEEN LUERS  Relationship to Patient: SELF     Phone Number: [phone]  Reason for Call:PATIENT HAD TO RESCHEDULE TODAY'S APPOINTMENT. SHE IS HAVING A SIDE EFFECT FROM A MEDICATION ANOTHER PROVIDER HAS PRESCRIBED TO HER AND WAS UNABLE TO CALL EARLIER DUE TO HER APPOINTMENT BEING AT 7:45 AM. HER APPOINTMENT HAS BEEN RESCHEDULED TO 8/9/22.

## 2022-08-01 PROCEDURE — 83690 ASSAY OF LIPASE: CPT

## 2022-08-01 PROCEDURE — 80053 COMPREHEN METABOLIC PANEL: CPT

## 2022-08-01 PROCEDURE — 81025 URINE PREGNANCY TEST: CPT | Performed by: STUDENT IN AN ORGANIZED HEALTH CARE EDUCATION/TRAINING PROGRAM

## 2022-08-01 PROCEDURE — 99283 EMERGENCY DEPT VISIT LOW MDM: CPT

## 2022-08-01 PROCEDURE — 85025 COMPLETE CBC W/AUTO DIFF WBC: CPT

## 2022-08-01 RX ORDER — SODIUM CHLORIDE 9 MG/ML
10 INJECTION INTRAVENOUS AS NEEDED
Status: DISCONTINUED | OUTPATIENT
Start: 2022-08-01 | End: 2022-08-02 | Stop reason: HOSPADM

## 2022-08-02 ENCOUNTER — APPOINTMENT (OUTPATIENT)
Dept: CT IMAGING | Facility: HOSPITAL | Age: 27
End: 2022-08-02

## 2022-08-02 ENCOUNTER — HOSPITAL ENCOUNTER (EMERGENCY)
Facility: HOSPITAL | Age: 27
Discharge: HOME | End: 2022-08-02
Attending: STUDENT IN AN ORGANIZED HEALTH CARE EDUCATION/TRAINING PROGRAM | Admitting: STUDENT IN AN ORGANIZED HEALTH CARE EDUCATION/TRAINING PROGRAM

## 2022-08-02 VITALS
DIASTOLIC BLOOD PRESSURE: 95 MMHG | HEART RATE: 71 BPM | HEIGHT: 70 IN | TEMPERATURE: 98.7 F | SYSTOLIC BLOOD PRESSURE: 121 MMHG | WEIGHT: 208 LBS | OXYGEN SATURATION: 97 % | RESPIRATION RATE: 16 BRPM | BODY MASS INDEX: 29.78 KG/M2

## 2022-08-02 DIAGNOSIS — R11.0 NAUSEA: ICD-10-CM

## 2022-08-02 DIAGNOSIS — R19.7 DIARRHEA, UNSPECIFIED TYPE: ICD-10-CM

## 2022-08-02 DIAGNOSIS — R10.9 NONSPECIFIC ABDOMINAL PAIN: Primary | ICD-10-CM

## 2022-08-02 LAB
ALBUMIN SERPL-MCNC: 4.2 G/DL (ref 3.5–5.2)
ALBUMIN/GLOB SERPL: 1.6 G/DL
ALP SERPL-CCNC: 104 U/L (ref 39–117)
ALT SERPL W P-5'-P-CCNC: 20 U/L (ref 1–33)
ANION GAP SERPL CALCULATED.3IONS-SCNC: 12 MMOL/L (ref 5–15)
AST SERPL-CCNC: 15 U/L (ref 1–32)
B-HCG UR QL: NEGATIVE
BACTERIA UR QL AUTO: NORMAL /HPF
BASOPHILS # BLD AUTO: 0.02 10*3/MM3 (ref 0–0.2)
BASOPHILS NFR BLD AUTO: 0.2 % (ref 0–1.5)
BILIRUB SERPL-MCNC: 0.6 MG/DL (ref 0–1.2)
BILIRUB UR QL STRIP: NEGATIVE
BUN SERPL-MCNC: 10 MG/DL (ref 6–20)
BUN/CREAT SERPL: 9.3 (ref 7–25)
CALCIUM SPEC-SCNC: 9.5 MG/DL (ref 8.6–10.5)
CHLORIDE SERPL-SCNC: 109 MMOL/L (ref 98–107)
CLARITY UR: ABNORMAL
CO2 SERPL-SCNC: 18 MMOL/L (ref 22–29)
COD CRY URNS QL: NORMAL /HPF
COLOR UR: YELLOW
CREAT SERPL-MCNC: 1.07 MG/DL (ref 0.57–1)
DEPRECATED RDW RBC AUTO: 37.3 FL (ref 37–54)
EGFRCR SERPLBLD CKD-EPI 2021: 73.6 ML/MIN/1.73
EOSINOPHIL # BLD AUTO: 0.51 10*3/MM3 (ref 0–0.4)
EOSINOPHIL NFR BLD AUTO: 4.5 % (ref 0.3–6.2)
ERYTHROCYTE [DISTWIDTH] IN BLOOD BY AUTOMATED COUNT: 11.9 % (ref 12.3–15.4)
EXPIRATION DATE: NORMAL
GLOBULIN UR ELPH-MCNC: 2.6 GM/DL
GLUCOSE SERPL-MCNC: 97 MG/DL (ref 65–99)
GLUCOSE UR STRIP-MCNC: NEGATIVE MG/DL
HCT VFR BLD AUTO: 44.4 % (ref 34–46.6)
HGB BLD-MCNC: 15.4 G/DL (ref 12–15.9)
HGB UR QL STRIP.AUTO: NEGATIVE
HOLD SPECIMEN: NORMAL
HYALINE CASTS UR QL AUTO: NORMAL /LPF
IMM GRANULOCYTES # BLD AUTO: 0.06 10*3/MM3 (ref 0–0.05)
IMM GRANULOCYTES NFR BLD AUTO: 0.5 % (ref 0–0.5)
INTERNAL NEGATIVE CONTROL: NORMAL
INTERNAL POSITIVE CONTROL: NORMAL
KETONES UR QL STRIP: ABNORMAL
LEUKOCYTE ESTERASE UR QL STRIP.AUTO: NEGATIVE
LIPASE SERPL-CCNC: 31 U/L (ref 13–60)
LYMPHOCYTES # BLD AUTO: 2.67 10*3/MM3 (ref 0.7–3.1)
LYMPHOCYTES NFR BLD AUTO: 23.6 % (ref 19.6–45.3)
Lab: NORMAL
MCH RBC QN AUTO: 29.9 PG (ref 26.6–33)
MCHC RBC AUTO-ENTMCNC: 34.7 G/DL (ref 31.5–35.7)
MCV RBC AUTO: 86.2 FL (ref 79–97)
MONOCYTES # BLD AUTO: 0.61 10*3/MM3 (ref 0.1–0.9)
MONOCYTES NFR BLD AUTO: 5.4 % (ref 5–12)
MUCOUS THREADS URNS QL MICRO: NORMAL /HPF
NEUTROPHILS NFR BLD AUTO: 65.8 % (ref 42.7–76)
NEUTROPHILS NFR BLD AUTO: 7.44 10*3/MM3 (ref 1.7–7)
NITRITE UR QL STRIP: NEGATIVE
NRBC BLD AUTO-RTO: 0 /100 WBC (ref 0–0.2)
PH UR STRIP.AUTO: <=5 [PH] (ref 5–8)
PLATELET # BLD AUTO: 372 10*3/MM3 (ref 140–450)
PMV BLD AUTO: 9.8 FL (ref 6–12)
POTASSIUM SERPL-SCNC: 4 MMOL/L (ref 3.5–5.2)
PROT SERPL-MCNC: 6.8 G/DL (ref 6–8.5)
PROT UR QL STRIP: NEGATIVE
RBC # BLD AUTO: 5.15 10*6/MM3 (ref 3.77–5.28)
RBC # UR STRIP: NORMAL /HPF
REF LAB TEST METHOD: NORMAL
SODIUM SERPL-SCNC: 139 MMOL/L (ref 136–145)
SP GR UR STRIP: 1.03 (ref 1–1.03)
SQUAMOUS #/AREA URNS HPF: NORMAL /HPF
UROBILINOGEN UR QL STRIP: ABNORMAL
WBC # UR STRIP: NORMAL /HPF
WBC NRBC COR # BLD: 11.31 10*3/MM3 (ref 3.4–10.8)
WHOLE BLOOD HOLD COAG: NORMAL
WHOLE BLOOD HOLD SPECIMEN: NORMAL

## 2022-08-02 PROCEDURE — 74177 CT ABD & PELVIS W/CONTRAST: CPT

## 2022-08-02 PROCEDURE — 96374 THER/PROPH/DIAG INJ IV PUSH: CPT

## 2022-08-02 PROCEDURE — 25010000002 ONDANSETRON PER 1 MG: Performed by: PHYSICIAN ASSISTANT

## 2022-08-02 PROCEDURE — 81001 URINALYSIS AUTO W/SCOPE: CPT | Performed by: STUDENT IN AN ORGANIZED HEALTH CARE EDUCATION/TRAINING PROGRAM

## 2022-08-02 PROCEDURE — 25010000002 MORPHINE PER 10 MG: Performed by: STUDENT IN AN ORGANIZED HEALTH CARE EDUCATION/TRAINING PROGRAM

## 2022-08-02 PROCEDURE — 96375 TX/PRO/DX INJ NEW DRUG ADDON: CPT

## 2022-08-02 PROCEDURE — 25010000002 KETOROLAC TROMETHAMINE PER 15 MG: Performed by: PHYSICIAN ASSISTANT

## 2022-08-02 PROCEDURE — 25010000002 IOPAMIDOL 61 % SOLUTION: Performed by: STUDENT IN AN ORGANIZED HEALTH CARE EDUCATION/TRAINING PROGRAM

## 2022-08-02 RX ORDER — ONDANSETRON 2 MG/ML
4 INJECTION INTRAMUSCULAR; INTRAVENOUS ONCE
Status: COMPLETED | OUTPATIENT
Start: 2022-08-02 | End: 2022-08-02

## 2022-08-02 RX ORDER — MORPHINE SULFATE 2 MG/ML
2 INJECTION, SOLUTION INTRAMUSCULAR; INTRAVENOUS ONCE
Status: COMPLETED | OUTPATIENT
Start: 2022-08-02 | End: 2022-08-02

## 2022-08-02 RX ORDER — PROMETHAZINE HYDROCHLORIDE 25 MG/1
25 SUPPOSITORY RECTAL EVERY 6 HOURS PRN
Qty: 12 SUPPOSITORY | Refills: 0 | Status: SHIPPED | OUTPATIENT
Start: 2022-08-02 | End: 2022-08-05

## 2022-08-02 RX ORDER — SODIUM CHLORIDE 0.9 % (FLUSH) 0.9 %
10 SYRINGE (ML) INJECTION AS NEEDED
Status: DISCONTINUED | OUTPATIENT
Start: 2022-08-02 | End: 2022-08-02 | Stop reason: HOSPADM

## 2022-08-02 RX ORDER — KETOROLAC TROMETHAMINE 15 MG/ML
15 INJECTION, SOLUTION INTRAMUSCULAR; INTRAVENOUS ONCE
Status: COMPLETED | OUTPATIENT
Start: 2022-08-02 | End: 2022-08-02

## 2022-08-02 RX ADMIN — IOPAMIDOL 90 ML: 612 INJECTION, SOLUTION INTRAVENOUS at 02:05

## 2022-08-02 RX ADMIN — SODIUM CHLORIDE 1000 ML: 9 INJECTION, SOLUTION INTRAVENOUS at 01:20

## 2022-08-02 RX ADMIN — ONDANSETRON 4 MG: 2 INJECTION INTRAMUSCULAR; INTRAVENOUS at 03:19

## 2022-08-02 RX ADMIN — KETOROLAC TROMETHAMINE 15 MG: 15 INJECTION, SOLUTION INTRAMUSCULAR; INTRAVENOUS at 03:03

## 2022-08-02 RX ADMIN — MORPHINE SULFATE 2 MG: 2 INJECTION, SOLUTION INTRAMUSCULAR; INTRAVENOUS at 01:21

## 2022-08-03 ENCOUNTER — TELEMEDICINE (OUTPATIENT)
Dept: FAMILY MEDICINE CLINIC | Facility: CLINIC | Age: 27
End: 2022-08-03

## 2022-08-03 ENCOUNTER — PATIENT MESSAGE (OUTPATIENT)
Dept: FAMILY MEDICINE CLINIC | Facility: CLINIC | Age: 27
End: 2022-08-03

## 2022-08-03 DIAGNOSIS — K52.9 COLITIS: ICD-10-CM

## 2022-08-03 DIAGNOSIS — R19.7 DIARRHEA, UNSPECIFIED TYPE: Primary | ICD-10-CM

## 2022-08-03 PROCEDURE — 99213 OFFICE O/P EST LOW 20 MIN: CPT | Performed by: STUDENT IN AN ORGANIZED HEALTH CARE EDUCATION/TRAINING PROGRAM

## 2022-08-03 RX ORDER — METRONIDAZOLE 500 MG/1
500 TABLET ORAL 3 TIMES DAILY
Qty: 15 TABLET | Refills: 0 | Status: SHIPPED | OUTPATIENT
Start: 2022-08-03 | End: 2022-08-11

## 2022-08-03 RX ORDER — CIPROFLOXACIN 500 MG/1
500 TABLET, FILM COATED ORAL 2 TIMES DAILY
Qty: 10 TABLET | Refills: 0 | Status: SHIPPED | OUTPATIENT
Start: 2022-08-03 | End: 2022-08-11

## 2022-08-03 NOTE — PROGRESS NOTES
Chief Complaint  No chief complaint on file.    Subjective         Kathleen T Luers presents to [facility]  History of Present Illness     Kathleen Luers is a 26 y.o. female with ADHD, anxiety, depression, headaches, obesity who presents to follow up on abdominal pain.    She reports watery diarrhea, LLQ pain, light headedness and cramping ongoing for 5 days. Symptoms have not improved whatsoever and seem to be worsening in frequency and abdominal pain. She presented to  ER yesterday. She received IVF, pain medications and was discharged home after unrevealing labs and CT A/P. She has been following bland diet (toast, broth) but this caused abdominal pain and because of this she has avoided PO intake other than water. Denies recent travel. She had covid one month ago. She has tried using bentyl, imodium, gasex with no relief. Denies fever, vomiting, BRBPR. Denies known sick contacts. Denies recent medication changes. She denies recent antibiotic use.       Objective   Vital Signs:   There were no vitals taken for this visit.    Physical Exam   Constitutional: She appears well-developed and well-nourished. No distress.   Pulmonary/Chest: Effort normal.   Psychiatric: She has a normal mood and affect.     Result Review :   The following data was reviewed by: Jimena Desai DO on 08/03/2022:  Common labs    Common Labsle 3/16/22 3/16/22 3/16/22 3/16/22 8/1/22 8/1/22    1301 1301 1301 1301 2352 2352   Glucose    79  97   BUN    12  10   Creatinine    0.96  1.07 (A)   Sodium    136  139   Potassium    4.7  4.0   Chloride    106  109 (A)   Calcium    10.0  9.5   Albumin    4.60  4.20   Total Bilirubin    0.5  0.6   Alkaline Phosphatase    98  104   AST (SGOT)    25  15   ALT (SGPT)    31  20   WBC 11.94 (A)    11.31 (A)    Hemoglobin 15.6    15.4    Hematocrit 47.3 (A)    44.4    Platelets 428    372    Total Cholesterol   229 (A)      Triglycerides   146      HDL Cholesterol   44       LDL Cholesterol    158 (A)      Hemoglobin A1C  5.30       (A) Abnormal value       Comments are available for some flowsheets but are not being displayed.           Data reviewed: Radiologic studies CT A/P          Assessment and Plan    Diagnoses and all orders for this visit:    1. Diarrhea, unspecified type (Primary)    2. Colitis    Other orders  -     ciprofloxacin (Cipro) 500 MG tablet; Take 1 tablet by mouth 2 (Two) Times a Day.  Dispense: 10 tablet; Refill: 0  -     metroNIDAZOLE (Flagyl) 500 MG tablet; Take 1 tablet by mouth 3 (Three) Times a Day.  Dispense: 15 tablet; Refill: 0    She continues to have several episodes of diarrhea per day with LLQ pain. Symptoms have not improved despite 5-6 days of conservative therapy. Reviewed labs drawn in the ER and CT A/P which was unrevealing for acute intraabdominal pathology. However, her clinical presentation is consistent with colitis/diverticulitis and we discussed empirically treat with cipro flagyl. Advised her to let me know if symptoms worsen or do not improve. Emphasized the importance of adequate hydration. Continue bland diet as tolerated. If symptoms do not improve she will need stool studies.       Follow Up   No follow-ups on file.  Patient was given instructions and counseling regarding her condition or for health maintenance advice. Please see specific information pulled into the AVS if appropriate.     Mode of Visit: Video  Location of patient: home  You have chosen to receive care through a telehealth visit.  The patient has signed the video visit consent form.  The visit included audio and video interaction. No technical issues occurred during this visit.

## 2022-08-07 NOTE — ED PROVIDER NOTES
[place]    EMERGENCY DEPARTMENT ENCOUNTER      Pt Name: Kathleen T Luers  MRN: [id number]  YOB: 1995  Date of evaluation: 8/1/2022  Provider: KEYON Nichols    CHIEF COMPLAINT       Chief Complaint   Patient presents with   • Abdominal Pain   • Nausea   • Dizziness   • Diarrhea         HISTORY OF PRESENT ILLNESS  (Location/Symptom, Timing/Onset, Context/Setting, Quality, Duration, Modifying Factors, Severity.)   Kathleen T Luers is a 26 y.o. female who presents to the emergency department with complaints of left lower quadrant abdominal pain.  Patient reports that her symptoms started Friday night with episodes of watery diarrhea.  She states that she has been nauseous but she has not vomited.  She denies any fever.  She reports no urinary symptoms. She does state that she has not been eating well and has had decreased oral intake.  She denies anything specific makes her symptoms better or worse. She shares taking Tylenol to help alleviate her pain which did not help. She also reports some dizziness.  No additional associated symptoms on interview and exam.    [facility]   Nursing notes were reviewed.    REVIEW OF SYSTEMS    (2-9 systems for level 4, 10 or more for level 5)   Review of Systems   Constitutional: Positive for activity change and appetite change. Negative for chills and fever.   Gastrointestinal: Positive for abdominal pain, diarrhea and nausea. Negative for constipation and vomiting.   Genitourinary: Negative.         All systems reviewed and negative except for those discussed in HPI.   PAST MEDICAL HISTORY     Past Medical History:   Diagnosis Date   • ADHD (attention deficit hyperactivity disorder)    • Anxiety    • Depression    • Headache    • Low back pain    • Obesity    • Urinary tract infection     Have had a few over the years         SURGICAL HISTORY       Past Surgical History:   Procedure Laterality Date   • WISDOM TOOTH EXTRACTION           CURRENT MEDICATIONS     No current  facility-administered medications for this encounter.    Current Outpatient Medications:   •  buPROPion XL (WELLBUTRIN XL) 300 MG 24 hr tablet, Take 1 tablet by mouth Daily., Disp: 30 tablet, Rfl: 5  •  clonazePAM (KlonoPIN) 0.5 MG tablet, Take 1.5 tablets by mouth Daily., Disp: 45 tablet, Rfl: 0  •  escitalopram (LEXAPRO) 10 MG tablet, Take 1 tablet by mouth Daily., Disp: 30 tablet, Rfl: 5  •  medroxyPROGESTERone Acetate 150 MG/ML suspension prefilled syringe, Inject 1 mL into the appropriate muscle as directed by prescriber Every 3 (Three) Months., Disp: 1 mL, Rfl: 1  •  methylphenidate (Ritalin) 20 MG tablet, Take 1 tablet by mouth one time daily, Disp: 30 tablet, Rfl: 0  •  methylphenidate LA (Ritalin LA) 30 MG 24 hr capsule, Take 1 capsule by mouth 1 time daily, Disp: 30 capsule, Rfl: 0  •  ondansetron ODT (ZOFRAN-ODT) 4 MG disintegrating tablet, Place 1 tablet under the tongue Every 6 (Six) Hours As Needed for nausea, Disp: 20 tablet, Rfl: 0  •  propranolol (INDERAL) 10 MG tablet, Take 1 tablet by mouth 3 (Three) Times a Day As Needed., Disp: 90 tablet, Rfl: 5  •  Semaglutide,0.25 or 0.5MG/DOS, (Ozempic, 0.25 or 0.5 MG/DOSE,) 2 MG/1.5ML solution pen-injector, Inject 0.5 mg under the skin into the appropriate area as directed once a week for 4 weeks., Disp: 1.5 mL, Rfl: 0  •  topiramate (Topamax) 25 MG tablet, Take 3 tablets by mouth Every Morning., Disp: 90 tablet, Rfl: 5  •  topiramate (TOPAMAX) 50 MG tablet, Take 1 tablet by mouth every night at bedtime., Disp: 30 tablet, Rfl: 5  •  buPROPion XL (Wellbutrin XL) 150 MG 24 hr tablet, Take 1 tablet by mouth Daily. (with Wellbutrin XL 300mg QD; total dose 450mg QD), Disp: 90 tablet, Rfl: 0  •  ciprofloxacin (Cipro) 500 MG tablet, Take 1 tablet by mouth 2 (Two) Times a Day., Disp: 10 tablet, Rfl: 0  •  metroNIDAZOLE (Flagyl) 500 MG tablet, Take 1 tablet by mouth 3 (Three) Times a Day., Disp: 15 tablet, Rfl: 0  •  QUEtiapine (SEROquel) 25 MG tablet, Take 1-2  tablets by mouth at bedtime as needed, Disp: 60 tablet, Rfl: 0    ALLERGIES     Morphine    FAMILY HISTORY       Family History   Problem Relation Age of Onset   • Anxiety disorder Mother    • Asthma Mother    • Depression Mother    • Mental illness Mother    • Miscarriages / Stillbirths Mother    • Arthritis Maternal Grandfather    • Cancer Paternal Grandfather    • Heart disease Paternal Grandfather    • Hyperlipidemia Paternal Grandfather    • Hypertension Paternal Grandfather    • Alcohol abuse Maternal Aunt    • Drug abuse Maternal Aunt    • Cancer Paternal Uncle    • Cancer Maternal Aunt           SOCIAL HISTORY       Social History     Socioeconomic History   • Marital status: Single   Tobacco Use   • Smoking status: Never Smoker   • Smokeless tobacco: Never Used   Substance and Sexual Activity   • Alcohol use: Yes     Comment: Very occasionally. Not weekly   • Drug use: Never   • Sexual activity: Yes     Partners: Male     Birth control/protection: Injection         PHYSICAL EXAM    (up to 7 for level 4, 8 or more for level 5)   Physical Exam  Vitals and nursing note reviewed.   Constitutional:       General: She is not in acute distress.     Appearance: Normal appearance. She is well-developed. She is not ill-appearing or toxic-appearing.   HENT:      Head: Normocephalic and atraumatic.      Nose: Nose normal.      Mouth/Throat:      Mouth: Mucous membranes are moist.   Eyes:      Extraocular Movements: Extraocular movements intact.   Cardiovascular:      Rate and Rhythm: Normal rate and regular rhythm.   Pulmonary:      Effort: Pulmonary effort is normal. No respiratory distress.      Breath sounds: Normal breath sounds.   Abdominal:      General: There is no distension.      Palpations: Abdomen is soft.      Tenderness: There is generalized abdominal tenderness. There is no guarding or rebound.   Musculoskeletal:         General: Normal range of motion.      Cervical back: Normal range of motion.    Skin:     General: Skin is warm and dry.   Neurological:      General: No focal deficit present.      Mental Status: She is alert.   Psychiatric:         Behavior: Behavior normal.         Thought Content: Thought content normal.         Judgment: Judgment normal.          DIAGNOSTIC RESULTS     EKG: All EKGs are interpreted by the Emergency Department Physician who either signs or Co-signs this chart in the absence of a cardiologist.    No orders to display       RADIOLOGY:   Non-plain film images such as CT, Ultrasound and MRI are read by the radiologist. Plain radiographic images are visualized and preliminarily interpreted by the emergency physician with the below findings:      [x] Radiologist's Report Reviewed:  CT Abdomen Pelvis With Contrast   Final Result      No acute process within the abdomen or pelvis.               Electronically signed by:  Alfredo Braga D.O.     8/2/2022 1:05 AM Mountain Time            ED BEDSIDE ULTRASOUND:   Performed by ED Physician - none    LABS:    I have reviewed and interpreted all of the currently available lab results from this visit (if applicable):  Results for orders placed or performed during the hospital encounter of 08/02/22   Comprehensive Metabolic Panel    Specimen: Blood   Result Value Ref Range    Glucose 97 65 - 99 mg/dL    BUN 10 6 - 20 mg/dL    Creatinine 1.07 (H) 0.57 - 1.00 mg/dL    Sodium 139 136 - 145 mmol/L    Potassium 4.0 3.5 - 5.2 mmol/L    Chloride 109 (H) 98 - 107 mmol/L    CO2 18.0 (L) 22.0 - 29.0 mmol/L    Calcium 9.5 8.6 - 10.5 mg/dL    Total Protein 6.8 6.0 - 8.5 g/dL    Albumin 4.20 3.50 - 5.20 g/dL    ALT (SGPT) 20 1 - 33 U/L    AST (SGOT) 15 1 - 32 U/L    Alkaline Phosphatase 104 39 - 117 U/L    Total Bilirubin 0.6 0.0 - 1.2 mg/dL    Globulin 2.6 gm/dL    A/G Ratio 1.6 g/dL    BUN/Creatinine Ratio 9.3 7.0 - 25.0    Anion Gap 12.0 5.0 - 15.0 mmol/L    eGFR 73.6 >60.0 mL/min/1.73   Lipase    Specimen: Blood   Result Value Ref Range     Lipase 31 13 - 60 U/L   Urinalysis With Microscopic If Indicated (No Culture) - Urine, Clean Catch    Specimen: Urine, Clean Catch   Result Value Ref Range    Color, UA Yellow Yellow, Straw    Appearance, UA Turbid (A) Clear    pH, UA <=5.0 5.0 - 8.0    Specific Gravity, UA 1.033 (H) 1.001 - 1.030    Glucose, UA Negative Negative    Ketones, UA 40 mg/dL (2+) (A) Negative    Bilirubin, UA Negative Negative    Blood, UA Negative Negative    Protein, UA Negative Negative    Leuk Esterase, UA Negative Negative    Nitrite, UA Negative Negative    Urobilinogen, UA 0.2 E.U./dL 0.2 - 1.0 E.U./dL   CBC Auto Differential    Specimen: Blood   Result Value Ref Range    WBC 11.31 (H) 3.40 - 10.80 10*3/mm3    RBC 5.15 3.77 - 5.28 10*6/mm3    Hemoglobin 15.4 12.0 - 15.9 g/dL    Hematocrit 44.4 34.0 - 46.6 %    MCV 86.2 79.0 - 97.0 fL    MCH 29.9 26.6 - 33.0 pg    MCHC 34.7 31.5 - 35.7 g/dL    RDW 11.9 (L) 12.3 - 15.4 %    RDW-SD 37.3 37.0 - 54.0 fl    MPV 9.8 6.0 - 12.0 fL    Platelets 372 140 - 450 10*3/mm3    Neutrophil % 65.8 42.7 - 76.0 %    Lymphocyte % 23.6 19.6 - 45.3 %    Monocyte % 5.4 5.0 - 12.0 %    Eosinophil % 4.5 0.3 - 6.2 %    Basophil % 0.2 0.0 - 1.5 %    Immature Grans % 0.5 0.0 - 0.5 %    Neutrophils, Absolute 7.44 (H) 1.70 - 7.00 10*3/mm3    Lymphocytes, Absolute 2.67 0.70 - 3.10 10*3/mm3    Monocytes, Absolute 0.61 0.10 - 0.90 10*3/mm3    Eosinophils, Absolute 0.51 (H) 0.00 - 0.40 10*3/mm3    Basophils, Absolute 0.02 0.00 - 0.20 10*3/mm3    Immature Grans, Absolute 0.06 (H) 0.00 - 0.05 10*3/mm3    nRBC 0.0 0.0 - 0.2 /100 WBC   Urinalysis, Microscopic Only - Urine, Clean Catch    Specimen: Urine, Clean Catch   Result Value Ref Range    RBC, UA 0-2 None Seen, 0-2 /HPF    WBC, UA 0-2 None Seen, 0-2 /HPF    Bacteria, UA None Seen None Seen, Trace /HPF    Squamous Epithelial Cells, UA 0-2 None Seen, 0-2 /HPF    Hyaline Casts, UA 13-20 0 - 6 /LPF    Calcium Oxalate Crystals, UA Moderate/2+ None Seen /HPF    Mucus, UA  Trace None Seen, Trace /HPF    Methodology Manual Light Microscopy    POC Urine Pregnancy    Specimen: Urine   Result Value Ref Range    HCG, Urine, QL Negative Negative    Lot Number [id number]     Internal Positive Control Passed Positive, Passed    Internal Negative Control Passed Negative, Passed    Expiration Date 2023-09-30    Green Top (Gel)   Result Value Ref Range    Extra Tube Hold for add-ons.    Lavender Top   Result Value Ref Range    Extra Tube hold for add-on    Gold Top - SST   Result Value Ref Range    Extra Tube Hold for add-ons.    Gray Top   Result Value Ref Range    Extra Tube Hold for add-ons.    Light Blue Top   Result Value Ref Range    Extra Tube Hold for add-ons.         All other labs were within normal range or not returned as of this dictation.      EMERGENCY DEPARTMENT COURSE and DIFFERENTIAL DIAGNOSIS/MDM:   Vitals:    Vitals:    08/02/22 0331 08/02/22 0334 08/02/22 0341 08/02/22 0357   BP:   121/95 121/95   BP Location:    Right arm   Patient Position:    Sitting   Pulse: 70 67 72 71   Resp:    16   Temp:       TempSrc:       SpO2: 96%   97%   Weight:       Height:           ED Course as of 08/11/22 1120   Tue Aug 02, 2022   0337 Patient presents to ED with complaints of abdominal pain. Abdominal exam without peritoneal signs. No evidence of acute abdomen at this time. Well appearing. Low suspicion for acute hepatobiliary disease (includng acute cholecystitis), acute pancreatitis, PUD (including perforation), acute infectious processes (pneumonia, hepatitis, pyelonephritis), acute appendicitis, bowel obstruction or viscus perforation. Presentation not consistent with other acute, emergent causes of abdominal pain at this time. Labs/urinalysis obtained and reviewed demonstrate no acute or emergent abnormalities.  CT scan of abdomen/pelvis  demonstrates no acute process within the abdomen or pelvis. At time of discharge disposition patient is afebrile, nontoxic appearing, vital signs  stable and able to maintain O2 sats of 97% on room air. Patient will be discharged home with symptomatic care and outpatient follow up.  [JG]      ED Course User Index  [JG] Cory Aldridge PA       MDM  Number of Diagnoses or Management Options  Diarrhea, unspecified type: new, needed workup  Nausea: new, needed workup  Nonspecific abdominal pain: new, needed workup     Amount and/or Complexity of Data Reviewed  Clinical lab tests: reviewed  Tests in the radiology section of CPT®: reviewed    Risk of Complications, Morbidity, and/or Mortality  Presenting problems: low  Diagnostic procedures: low  Management options: low    Patient Progress  Patient progress: stable     I had a discussion with the patient/family regarding diagnosis, diagnostic results, treatment plan, and medications.  The patient/family indicated understanding of these instructions.  I spent adequate time at the bedside preceding discharge necessary to personally discuss the aftercare instructions, giving patient education, providing explanations of the results of our evaluations/findings, and my decision making to assure that the patient/family understand the plan of care.  Time was allotted to answer questions at that time and throughout the ED course.  Emphasis was placed on timely follow-up after discharge.  I also discussed the potential for the development of an acute emergent condition requiring further evaluation, admission, or even surgical intervention. I discussed that we found nothing during the visit today indicating the need for further workup, admission, or the presence of an unstable medical condition.  I encouraged the patient to return to the emergency department immediately for ANY concerns, worsening, new complaints, or if symptoms persist and unable to seek follow-up in a timely fashion.  The patient/family expressed understanding and agreement with this plan.  The patient will follow-up with primary care for reevaluation.        MEDICATIONS ADMINISTERED IN ED:  Medications   sodium chloride 0.9 % bolus 1,000 mL (0 mL Intravenous Stopped 8/2/22 0255)   morphine injection 2 mg (2 mg Intravenous Given 8/2/22 0121)   iopamidol (ISOVUE-300) 61 % injection 100 mL (90 mL Intravenous Given 8/2/22 0205)   ketorolac (TORADOL) injection 15 mg (15 mg Intravenous Given 8/2/22 0303)   ondansetron (ZOFRAN) injection 4 mg (4 mg Intravenous Given 8/2/22 0319)       PROCEDURES:  Procedures          CRITICAL CARE TIME    Total Critical Care time was 0 minutes, excluding separately reportable procedures.   There was a high probability of clinically significant/life threatening deterioration in the patient's condition which required my urgent intervention.      FINAL IMPRESSION      1. Nonspecific abdominal pain    2. Nausea    3. Diarrhea, unspecified type          DISPOSITION/PLAN     ED Disposition     ED Disposition   Discharge    Condition   Stable    Comment   --             PATIENT REFERRED TO:  Jimena Desai DO  [address] [address]  [phone]    Call   Call for follow-up appointment with your primary care    [facility] Emergency Department  [address] [facility] [address] [phone]  Go to   If symptoms worsen      DISCHARGE MEDICATIONS:     Medication List      CONTINUE taking these medications    * buPROPion  MG 24 hr tablet  Commonly known as: WELLBUTRIN XL  Take 1 tablet by mouth Daily.     * buPROPion  MG 24 hr tablet  Commonly known as: Wellbutrin XL  Take 1 tablet by mouth Daily. (with Wellbutrin XL 300mg QD; total dose 450mg QD)     clonazePAM 0.5 MG tablet  Commonly known as: KlonoPIN  Take 1.5 tablets by mouth Daily.     escitalopram 10 MG tablet  Commonly known as: LEXAPRO  Take 1 tablet by mouth Daily.     medroxyPROGESTERone Acetate 150 MG/ML suspension prefilled syringe  Inject 1 mL into the appropriate muscle as directed by prescriber Every 3 (Three)  Months.     * methylphenidate 20 MG tablet  Commonly known as: Ritalin  Take 1 tablet by mouth one time daily     * methylphenidate LA 30 MG 24 hr capsule  Commonly known as: Ritalin LA  Take 1 capsule by mouth 1 time daily     ondansetron ODT 4 MG disintegrating tablet  Commonly known as: ZOFRAN-ODT  Place 1 tablet under the tongue Every 6 (Six) Hours As Needed for nausea     Ozempic (0.25 or 0.5 MG/DOSE) 2 MG/1.5ML solution pen-injector  Generic drug: Semaglutide(0.25 or 0.5MG/DOS)  Inject 0.5 mg under the skin into the appropriate area as directed once a week for 4 weeks.     propranolol 10 MG tablet  Commonly known as: INDERAL  Take 1 tablet by mouth 3 (Three) Times a Day As Needed.     * topiramate 50 MG tablet  Commonly known as: TOPAMAX  Take 1 tablet by mouth every night at bedtime.     * topiramate 25 MG tablet  Commonly known as: Topamax  Take 3 tablets by mouth Every Morning.         * This list has 6 medication(s) that are the same as other medications prescribed for you. Read the directions carefully, and ask your doctor or other care provider to review them with you.            ASK your doctor about these medications    promethazine 25 MG suppository  Commonly known as: PHENERGAN  Insert 1 suppository into the rectum Every 6 (Six) Hours As Needed for Nausea or Vomiting for up to 3 days.  Ask about: Should I take this medication?           Where to Get Your Medications      These medications were sent to [id number] - [place], [place] - [address] [place] [address]  AT [facility] [phone]  - [phone] [address] [facility] [place] [address]    Phone: [phone]   · promethazine 25 MG suppository             Comment: Please note this report has been produced using speech recognition software.      KEYON Nichols Jason C, PA  08/11/22 9186

## 2022-08-11 ENCOUNTER — OFFICE VISIT (OUTPATIENT)
Dept: FAMILY MEDICINE CLINIC | Facility: CLINIC | Age: 27
End: 2022-08-11

## 2022-08-11 ENCOUNTER — LAB (OUTPATIENT)
Dept: LAB | Facility: HOSPITAL | Age: 27
End: 2022-08-11

## 2022-08-11 VITALS
WEIGHT: 209.4 LBS | OXYGEN SATURATION: 98 % | SYSTOLIC BLOOD PRESSURE: 126 MMHG | BODY MASS INDEX: 29.98 KG/M2 | HEIGHT: 70 IN | HEART RATE: 93 BPM | DIASTOLIC BLOOD PRESSURE: 92 MMHG

## 2022-08-11 DIAGNOSIS — R53.82 CHRONIC FATIGUE: Primary | ICD-10-CM

## 2022-08-11 DIAGNOSIS — R53.82 CHRONIC FATIGUE: ICD-10-CM

## 2022-08-11 DIAGNOSIS — R06.83 SNORING: ICD-10-CM

## 2022-08-11 DIAGNOSIS — G47.19 EXCESSIVE DAYTIME SLEEPINESS: ICD-10-CM

## 2022-08-11 PROCEDURE — 82306 VITAMIN D 25 HYDROXY: CPT

## 2022-08-11 PROCEDURE — 86038 ANTINUCLEAR ANTIBODIES: CPT

## 2022-08-11 PROCEDURE — 99213 OFFICE O/P EST LOW 20 MIN: CPT | Performed by: STUDENT IN AN ORGANIZED HEALTH CARE EDUCATION/TRAINING PROGRAM

## 2022-08-11 PROCEDURE — 82607 VITAMIN B-12: CPT

## 2022-08-11 NOTE — PROGRESS NOTES
Established Office Visit      Patient Name: Kathleen T Luers  : 1995   MRN: [id number]   Care Team: Patient Care Team:  Jimena Desai DO as PCP - General (Internal Medicine)    Chief Complaint:    Chief Complaint   Patient presents with   • Fatigue       History of Present Illness: Kathleen T Luers is a 26 y.o. female with ADHD, anxiety, depression, migraines who is here today to follow up with fatigue.     Feeling fatigued over the past several months. She feels she is sleeping well but admits no matter how much sleep she is getting, she does not feel rested. For example, she slept 9 hours last night and required a 3 hour nap during the day today. Her boyfriend has witnessed her snoring at night. Denies known apneic episodes or frequent night time awakenings. She has been referred to sleep medicine but referral was closed after failed attempts to contact. She would like follow up on this. She denies recent medication changes.     She recently recovered from colitis. Recovered well after antibiotics and adequate hydration. Diet has returned to normal.       Subjective      Review of Systems:   Review of Systems - See HPI    I have reviewed and the following portions of the patient's history were updated as appropriate: past family history, past medical history, past social history, past surgical history and problem list.    Medications:     Current Outpatient Medications:   •  buPROPion XL (Wellbutrin XL) 150 MG 24 hr tablet, Take 1 tablet by mouth Daily. (with Wellbutrin XL 300mg QD; total dose 450mg QD), Disp: 90 tablet, Rfl: 0  •  buPROPion XL (WELLBUTRIN XL) 300 MG 24 hr tablet, Take 1 tablet by mouth Daily., Disp: 30 tablet, Rfl: 5  •  clonazePAM (KlonoPIN) 0.5 MG tablet, Take 1.5 tablets by mouth Daily., Disp: 45 tablet, Rfl: 0  •  escitalopram (LEXAPRO) 10 MG tablet, Take 1 tablet by mouth Daily., Disp: 30 tablet, Rfl: 5  •  medroxyPROGESTERone Acetate 150 MG/ML suspension prefilled syringe,  "Inject 1 mL into the appropriate muscle as directed by prescriber Every 3 (Three) Months., Disp: 1 mL, Rfl: 1  •  methylphenidate (Ritalin) 20 MG tablet, Take 1 tablet by mouth one time daily, Disp: 30 tablet, Rfl: 0  •  methylphenidate LA (Ritalin LA) 30 MG 24 hr capsule, Take 1 capsule by mouth 1 time daily, Disp: 30 capsule, Rfl: 0  •  ondansetron ODT (ZOFRAN-ODT) 4 MG disintegrating tablet, Place 1 tablet under the tongue Every 6 (Six) Hours As Needed for nausea, Disp: 20 tablet, Rfl: 0  •  propranolol (INDERAL) 10 MG tablet, Take 1 tablet by mouth 3 (Three) Times a Day As Needed., Disp: 90 tablet, Rfl: 5  •  QUEtiapine (SEROquel) 25 MG tablet, Take 1-2 tablets by mouth at bedtime as needed, Disp: 60 tablet, Rfl: 0  •  Semaglutide,0.25 or 0.5MG/DOS, (Ozempic, 0.25 or 0.5 MG/DOSE,) 2 MG/1.5ML solution pen-injector, Inject 0.5 mg under the skin into the appropriate area as directed once a week for 4 weeks., Disp: 1.5 mL, Rfl: 0  •  topiramate (Topamax) 25 MG tablet, Take 3 tablets by mouth Every Morning., Disp: 90 tablet, Rfl: 5  •  topiramate (TOPAMAX) 50 MG tablet, Take 1 tablet by mouth every night at bedtime., Disp: 30 tablet, Rfl: 5    Allergies:   Allergies   Allergen Reactions   • Morphine Shortness Of Breath       Objective     Physical Exam:  Vital Signs:   Vitals:    08/11/22 1348   BP: 126/92   Pulse: 93   SpO2: 98%   Weight: 95 kg (209 lb 6.4 oz)   Height: 177.8 cm (70\")     Body mass index is 30.05 kg/m².     Physical Exam  Vitals reviewed.   Constitutional:       Appearance: Normal appearance.   Cardiovascular:      Rate and Rhythm: Normal rate.   Pulmonary:      Effort: Pulmonary effort is normal. No respiratory distress.   Skin:     General: Skin is warm and dry.   Neurological:      Mental Status: She is alert.   Psychiatric:         Mood and Affect: Mood normal.         Behavior: Behavior normal.         Judgment: Judgment normal.         Assessment / Plan      Assessment/Plan:   Problems " Addressed This Visit  Diagnoses and all orders for this visit:    1. Chronic fatigue (Primary)  -     Vitamin B12; Future  -     Vitamin D 25 hydroxy; Future  -     JESÚS by IFA, Reflex 9-biomarkers profile; Future  -     Ambulatory Referral to Sleep Medicine  Will obtain labs today to evaluate for underlying metabolic dysfunction or deficiency. Discussed importance of healthy sleep hygiene habits. Referred to sleep medicine for evaluation of PAU, excessive daytime sleepiness. Reviewed recent TSH, CBC, CMP, A1c which are unrevealing for fatigue etiology. No recent medication changes but will discuss propranolol, seroquel, and lexapro as possible contributors if remaining workup is unrevealing.     2. Excessive daytime sleepiness  -     Ambulatory Referral to Sleep Medicine    3. Snoring  -     Ambulatory Referral to Sleep Medicine          Plan of care reviewed with patient at the conclusion of today's visit. Education was provided regarding diagnosis and management.  Patient verbalizes understanding of and agreement with management plan.    Follow Up:   Return if symptoms worsen or fail to improve.        DO LASHANDA Oneil RD  [facility]  [address]  [facility] [address]  Fax [phone]  Phone [phone]

## 2022-08-12 LAB
25(OH)D3 SERPL-MCNC: 42 NG/ML (ref 30–100)
VIT B12 BLD-MCNC: 345 PG/ML (ref 211–946)

## 2022-08-15 LAB
ANA SER QL IF: NEGATIVE
LABORATORY COMMENT REPORT: NORMAL

## 2022-09-30 ENCOUNTER — TELEMEDICINE (OUTPATIENT)
Dept: FAMILY MEDICINE CLINIC | Facility: CLINIC | Age: 27
End: 2022-09-30

## 2022-09-30 DIAGNOSIS — M54.12 CERVICAL RADICULOPATHY: ICD-10-CM

## 2022-09-30 DIAGNOSIS — F90.9 ATTENTION DEFICIT HYPERACTIVITY DISORDER (ADHD), UNSPECIFIED ADHD TYPE: ICD-10-CM

## 2022-09-30 DIAGNOSIS — M54.2 ACUTE NECK PAIN: Primary | ICD-10-CM

## 2022-09-30 DIAGNOSIS — F41.8 MIXED ANXIETY AND DEPRESSIVE DISORDER: ICD-10-CM

## 2022-09-30 PROCEDURE — 99214 OFFICE O/P EST MOD 30 MIN: CPT | Performed by: STUDENT IN AN ORGANIZED HEALTH CARE EDUCATION/TRAINING PROGRAM

## 2022-09-30 RX ORDER — CYCLOBENZAPRINE HCL 10 MG
10 TABLET ORAL 3 TIMES DAILY PRN
Qty: 30 TABLET | Refills: 0 | Status: SHIPPED | OUTPATIENT
Start: 2022-09-30 | End: 2022-12-23

## 2022-09-30 RX ORDER — PREDNISONE 20 MG/1
20 TABLET ORAL DAILY
Qty: 5 TABLET | Refills: 0 | Status: SHIPPED | OUTPATIENT
Start: 2022-09-30 | End: 2022-12-23

## 2022-09-30 NOTE — PROGRESS NOTES
Chief Complaint  No chief complaint on file.    Subjective         Kathleen T Luers presents to [facility]     History of Present Illness     Kathleen T Luers is a 26 y.o. female with ADHD, anxiety, depression, migraines who presents via telehealth to discuss    She reports 1 week history of left sided neck neck pain/spasm/tightening with radiating into shoulder blade . No injury or trauma. She does on occasion have numbness/tingkling radiating into left hand/arm. No weakness   She has tried using ibuprofen aorund the clock without improvement, robaxin 750mg.      She has chronic upper back pain/ neck pain due to large breasts. She is following with plastic surgery and hoping for reduction. The current beck pain she is experiencing is different from this chronic pain.     Since last visit she has discontinued several of her medications (wellbutrin, seroquel, propranolol, topamax, clonazepam). Her mood is feeling better than ever. She is very happy with this. Only taking Ritalin for ADHD and lexapro for anxiety/depression   ADHD - Follows with [facility]    Objective   Vital Signs:   There were no vitals taken for this visit.    Physical Exam   Constitutional: She appears well-developed and well-nourished. No distress.   Neck:   Difficulty with left rotation due to discomfort, otherwise ROM intact   Pulmonary/Chest: Effort normal.   Neurological: She is alert.   Psychiatric: She has a normal mood and affect.     Result Review :                 Assessment and Plan    Diagnoses and all orders for this visit:    1. Acute neck pain (Primary)  -     predniSONE (DELTASONE) 20 MG tablet; Take 1 tablet by mouth Daily.  Dispense: 5 tablet; Refill: 0  -     Ambulatory Referral to Physical Therapy Evaluate and treat  -     cyclobenzaprine (FLEXERIL) 10 MG tablet; Take 1 tablet by mouth 3 (Three) Times a Day As Needed for Muscle Spasms.  Dispense: 30 tablet; Refill: 0    2.  Cervical radiculopathy  -     Ambulatory Referral to Physical Therapy Evaluate and treat    3. Mixed anxiety and depressive disorder    4. Attention deficit hyperactivity disorder (ADHD), unspecified ADHD type    for acute neck pain without trauma/injury - she has failed trial of NSAIDs and Robaxin at home. We will try short prednisone course and switch to Flexeril. Okay to continue tylenol as needed for breakthrough pain but avoid systemic NSAIDs. Referred to PT as well. Continue heating pad and gentle stretching.     For ADHD, anxiety, depression - she is doing very well after discontinuing several medication. Agree with continuing lexapro for anxiety/depression and Ritalin for ADHD management. She follows with behavioral health for her Ritalin management       Follow Up   No follow-ups on file.  Patient was given instructions and counseling regarding her condition or for health maintenance advice. Please see specific information pulled into the AVS if appropriate.     Mode of Visit: Video  Location of patient: home  Location of provider[facility]  You have chosen to receive care through a telehealth visit.  The patient has signed the video visit consent form.  The visit included audio and video interaction. No technical issues occurred during this visit.

## 2022-10-13 DIAGNOSIS — G43.109 MIGRAINE WITH AURA AND WITHOUT STATUS MIGRAINOSUS, NOT INTRACTABLE: Primary | ICD-10-CM

## 2022-10-13 RX ORDER — TOPIRAMATE 50 MG/1
50 TABLET, FILM COATED ORAL DAILY
Qty: 30 TABLET | Refills: 5 | Status: SHIPPED | OUTPATIENT
Start: 2022-10-13

## 2022-12-07 RX ORDER — MEDROXYPROGESTERONE ACETATE 150 MG/ML
150 INJECTION, SUSPENSION INTRAMUSCULAR
Qty: 1 ML | Refills: 1 | Status: SHIPPED | OUTPATIENT
Start: 2022-12-07

## 2022-12-07 NOTE — TELEPHONE ENCOUNTER
Rx Refill Note  Requested Prescriptions     Pending Prescriptions Disp Refills   • medroxyPROGESTERone Acetate 150 MG/ML suspension prefilled syringe 1 mL 1     Sig: Inject 1 mL into the appropriate muscle as directed by prescriber Every 3 (Three) Months.      Last office visit with prescribing clinician: 8/11/2022   Last telemedicine visit with prescribing clinician: Visit date not found   Next office visit with prescribing clinician: Visit date not found                         Would you like a call back once the refill request has been completed: [] Yes [] No    If the office needs to give you a call back, can they leave a voicemail: [] Yes [] No    Rosmery Serna MA  12/07/22, 08:30 EST

## 2022-12-23 ENCOUNTER — TELEMEDICINE (OUTPATIENT)
Dept: FAMILY MEDICINE CLINIC | Facility: CLINIC | Age: 27
End: 2022-12-23

## 2022-12-23 VITALS — WEIGHT: 209.6 LBS | BODY MASS INDEX: 30.01 KG/M2 | HEIGHT: 70 IN

## 2022-12-23 DIAGNOSIS — E66.9 OBESITY (BMI 30.0-34.9): ICD-10-CM

## 2022-12-23 DIAGNOSIS — M62.838 MUSCLE SPASM: ICD-10-CM

## 2022-12-23 DIAGNOSIS — K64.9 HEMORRHOIDS, UNSPECIFIED HEMORRHOID TYPE: Primary | ICD-10-CM

## 2022-12-23 PROCEDURE — 99214 OFFICE O/P EST MOD 30 MIN: CPT | Performed by: STUDENT IN AN ORGANIZED HEALTH CARE EDUCATION/TRAINING PROGRAM

## 2022-12-23 RX ORDER — HYDROCORTISONE ACETATE 25 MG/1
25 SUPPOSITORY RECTAL 2 TIMES DAILY
Qty: 12 EACH | Refills: 0 | Status: SHIPPED | OUTPATIENT
Start: 2022-12-23 | End: 2023-03-03

## 2022-12-23 RX ORDER — CYCLOBENZAPRINE HCL 10 MG
10 TABLET ORAL 3 TIMES DAILY PRN
Qty: 30 TABLET | Refills: 0 | Status: SHIPPED | OUTPATIENT
Start: 2022-12-23 | End: 2023-03-03

## 2022-12-23 NOTE — PROGRESS NOTES
"Chief Complaint  No chief complaint on file.    Subjective         Kathleen T Luers presents to [facility]  History of Present Illness   Kathleen T Luers is a 27 y.o. female with ADHD, anxiety, depression, migraines who is here today via telehealth to follow up with medication review and chronic medical conditions      ADHD - Follows with [facility],  Anxiety/Depression - Lexapro, managed per behavioral health.    She was previously taking Ozempic and following with weight loss specialist. She has switched insurance policy and is unable to continue following with weight loss specialist, thus ran out of refills of ozempic. She lost 18 lb when taking this for a few months. She stopped ozempic in 11/2022 and has been able to keep most of the weight off. Unfortunately her appetite/cravings have increased and she is interested in getting back on Ozempic. She is has been unsuccessful with dietary modifications. She understands Ozempic is FDA approved for T2DM and while she does not have this diagnosis, she would like to continue treatment for obesity.  She does admit to experiencing a mild amount of nausea but other wise denied significant GI side effects. She was able to comfortably titrate to 1mg weekly dosing.    She reports having hemorrhoid over the past few months. Reports this comes and goes, sometimes itchy, uncomfortable, and bleeds. She has tried using suppositories and creams over the counter without relief. She has increased her fiber supplement.     She reports having muscle cramps in bilateral calves. Symptoms ongoing for the the past 2 weeks. She has not had significant changes in activity. She has taken tylenol, magnesium, and lidocaine creams with only minimal improvement. She dinks significant amount of water.         Objective   Vital Signs:   Ht 177.8 cm (70\")   Wt 95.1 kg (209 lb 9.6 oz)   BMI 30.07 kg/m²     Physical Exam   Constitutional: She appears " well-developed.   Pulmonary/Chest: Effort normal.   Psychiatric: She has a normal mood and affect.     Result Review :                 Assessment and Plan    Diagnoses and all orders for this visit:    1. Hemorrhoids, unspecified hemorrhoid type (Primary)    2. Obesity (BMI 30.0-34.9)    3. Muscle spasm    For hemorrhoids - continue high fiber diet, hydration, and avoidance of constipation/straining. Will send in anusol-HC suppositories. If hemorrhoids persist, we will discuss referral for surgical intervention     Patient has tried to lose weight in the past without significant success with lifestyle modifications alone. Their elevated BMI of 30 puts them at increased risk for developing cardiovascular disease, PAU, T2DM, metabolic syndrome, among others. Patient would benefit  from medication to assist with weight loss. We will try Ozempic as she has had success with this in the past and tolerated this well. Discussed potential side effects and patient will let me know if they experiences these. We discussed importance of uptitrating dose gradually to avoid GI side effects and they expressed understanding. She understands this is not FDA approved for weight loss alone and that she is taking this medication off label. She understands there are risks associated with off label medication use and has expressed she is comfortable with this as her previous weight loss program used this medication and she tolerated it well with success. Stressed the importance of continued dietary modifications and active lifestyle with goal of 150min exercise/week.    Will try flexeril 10mg TID prn for bilateral LE muscle cramping. If persistent, she will need to present in person for physical examination as telehealth limits diagnostic abilities and ability to perform complete MSK exam. She also may benefit from labs to evaluate electrolytes.       Follow Up   Return in about 8 weeks (around 2/17/2023).  Patient was given instructions  and counseling regarding her condition or for health maintenance advice. Please see specific information pulled into the AVS if appropriate.     Mode of Visit: Video  Location of patient: home  Location of provider: [facility]  You have chosen to receive care through a telehealth visit.  The patient has signed the video visit consent form.  The visit included audio and video interaction. No technical issues occurred during this visit.

## 2022-12-27 ENCOUNTER — PRIOR AUTHORIZATION (OUTPATIENT)
Dept: FAMILY MEDICINE CLINIC | Facility: CLINIC | Age: 27
End: 2022-12-27

## 2022-12-28 NOTE — TELEPHONE ENCOUNTER
PA denied, insurance is requiring a diagnosis of type 2 diabetes be attached. Additional info in determination letter in chart.

## 2022-12-29 ENCOUNTER — PATIENT MESSAGE (OUTPATIENT)
Dept: FAMILY MEDICINE CLINIC | Facility: CLINIC | Age: 27
End: 2022-12-29
Payer: COMMERCIAL

## 2022-12-29 DIAGNOSIS — K64.9 HEMORRHOIDS, UNSPECIFIED HEMORRHOID TYPE: Primary | ICD-10-CM

## 2023-01-04 ENCOUNTER — PATIENT MESSAGE (OUTPATIENT)
Dept: FAMILY MEDICINE CLINIC | Facility: CLINIC | Age: 28
End: 2023-01-04
Payer: COMMERCIAL

## 2023-01-06 ENCOUNTER — TELEPHONE (OUTPATIENT)
Dept: FAMILY MEDICINE CLINIC | Facility: CLINIC | Age: 28
End: 2023-01-06
Payer: COMMERCIAL

## 2023-01-19 ENCOUNTER — PATIENT MESSAGE (OUTPATIENT)
Dept: FAMILY MEDICINE CLINIC | Facility: CLINIC | Age: 28
End: 2023-01-19
Payer: COMMERCIAL

## 2023-01-24 DIAGNOSIS — E66.9 OBESITY (BMI 30.0-34.9): ICD-10-CM

## 2023-03-03 ENCOUNTER — OFFICE VISIT (OUTPATIENT)
Dept: FAMILY MEDICINE CLINIC | Facility: CLINIC | Age: 28
End: 2023-03-03
Payer: COMMERCIAL

## 2023-03-03 ENCOUNTER — LAB (OUTPATIENT)
Dept: LAB | Facility: HOSPITAL | Age: 28
End: 2023-03-03
Payer: COMMERCIAL

## 2023-03-03 VITALS
DIASTOLIC BLOOD PRESSURE: 80 MMHG | WEIGHT: 199 LBS | HEIGHT: 70 IN | HEART RATE: 100 BPM | SYSTOLIC BLOOD PRESSURE: 118 MMHG | OXYGEN SATURATION: 98 % | BODY MASS INDEX: 28.49 KG/M2

## 2023-03-03 DIAGNOSIS — R00.0 TACHYCARDIA: ICD-10-CM

## 2023-03-03 DIAGNOSIS — E53.8 VITAMIN B12 DEFICIENCY: ICD-10-CM

## 2023-03-03 DIAGNOSIS — E66.3 OVERWEIGHT (BMI 25.0-29.9): ICD-10-CM

## 2023-03-03 DIAGNOSIS — R35.0 URINARY FREQUENCY: ICD-10-CM

## 2023-03-03 DIAGNOSIS — E78.2 MIXED HYPERLIPIDEMIA: ICD-10-CM

## 2023-03-03 DIAGNOSIS — R00.0 TACHYCARDIA: Primary | ICD-10-CM

## 2023-03-03 LAB
ALBUMIN SERPL-MCNC: 4.5 G/DL (ref 3.5–5.2)
ALBUMIN/GLOB SERPL: 1.6 G/DL
ALP SERPL-CCNC: 82 U/L (ref 39–117)
ALT SERPL W P-5'-P-CCNC: 14 U/L (ref 1–33)
ANION GAP SERPL CALCULATED.3IONS-SCNC: 11.6 MMOL/L (ref 5–15)
AST SERPL-CCNC: 16 U/L (ref 1–32)
BILIRUB SERPL-MCNC: 0.7 MG/DL (ref 0–1.2)
BILIRUB UR QL STRIP: NEGATIVE
BUN SERPL-MCNC: 18 MG/DL (ref 6–20)
BUN/CREAT SERPL: 20.9 (ref 7–25)
CALCIUM SPEC-SCNC: 9.5 MG/DL (ref 8.6–10.5)
CHLORIDE SERPL-SCNC: 107 MMOL/L (ref 98–107)
CHOLEST SERPL-MCNC: 218 MG/DL (ref 0–200)
CLARITY UR: CLEAR
CO2 SERPL-SCNC: 21.4 MMOL/L (ref 22–29)
COLOR UR: YELLOW
CREAT SERPL-MCNC: 0.86 MG/DL (ref 0.57–1)
DEPRECATED RDW RBC AUTO: 39.1 FL (ref 37–54)
EGFRCR SERPLBLD CKD-EPI 2021: 95.1 ML/MIN/1.73
ERYTHROCYTE [DISTWIDTH] IN BLOOD BY AUTOMATED COUNT: 12.8 % (ref 12.3–15.4)
GLOBULIN UR ELPH-MCNC: 2.8 GM/DL
GLUCOSE SERPL-MCNC: 80 MG/DL (ref 65–99)
GLUCOSE UR STRIP-MCNC: NEGATIVE MG/DL
HCT VFR BLD AUTO: 41.9 % (ref 34–46.6)
HDLC SERPL QL: 5.07
HDLC SERPL-MCNC: 43 MG/DL (ref 40–60)
HGB BLD-MCNC: 14.6 G/DL (ref 12–15.9)
HGB UR QL STRIP.AUTO: NEGATIVE
KETONES UR QL STRIP: NEGATIVE
LDLC SERPL CALC-MCNC: 151 MG/DL (ref 0–100)
LEUKOCYTE ESTERASE UR QL STRIP.AUTO: ABNORMAL
MCH RBC QN AUTO: 30 PG (ref 26.6–33)
MCHC RBC AUTO-ENTMCNC: 34.8 G/DL (ref 31.5–35.7)
MCV RBC AUTO: 86 FL (ref 79–97)
NITRITE UR QL STRIP: NEGATIVE
PH UR STRIP.AUTO: 5.5 [PH] (ref 5–8)
PLATELET # BLD AUTO: 399 10*3/MM3 (ref 140–450)
PMV BLD AUTO: 10.4 FL (ref 6–12)
POTASSIUM SERPL-SCNC: 4.1 MMOL/L (ref 3.5–5.2)
PROT SERPL-MCNC: 7.3 G/DL (ref 6–8.5)
PROT UR QL STRIP: NEGATIVE
RBC # BLD AUTO: 4.87 10*6/MM3 (ref 3.77–5.28)
SODIUM SERPL-SCNC: 140 MMOL/L (ref 136–145)
SP GR UR STRIP: 1.02 (ref 1–1.03)
TRIGL SERPL-MCNC: 131 MG/DL (ref 0–150)
TSH SERPL DL<=0.05 MIU/L-ACNC: 1.16 UIU/ML (ref 0.27–4.2)
UROBILINOGEN UR QL STRIP: ABNORMAL
VIT B12 BLD-MCNC: 420 PG/ML (ref 211–946)
VLDLC SERPL-MCNC: 24 MG/DL (ref 5–40)
WBC NRBC COR # BLD: 7.05 10*3/MM3 (ref 3.4–10.8)

## 2023-03-03 PROCEDURE — 80050 GENERAL HEALTH PANEL: CPT

## 2023-03-03 PROCEDURE — 82607 VITAMIN B-12: CPT

## 2023-03-03 PROCEDURE — 99214 OFFICE O/P EST MOD 30 MIN: CPT | Performed by: STUDENT IN AN ORGANIZED HEALTH CARE EDUCATION/TRAINING PROGRAM

## 2023-03-03 PROCEDURE — 87086 URINE CULTURE/COLONY COUNT: CPT

## 2023-03-03 PROCEDURE — 80061 LIPID PANEL: CPT

## 2023-03-03 PROCEDURE — 81001 URINALYSIS AUTO W/SCOPE: CPT

## 2023-03-03 RX ORDER — DEXTROAMPHETAMINE SACCHARATE, AMPHETAMINE ASPARTATE, DEXTROAMPHETAMINE SULFATE AND AMPHETAMINE SULFATE 5; 5; 5; 5 MG/1; MG/1; MG/1; MG/1
1 TABLET ORAL DAILY
COMMUNITY
Start: 2023-01-18

## 2023-03-03 NOTE — PROGRESS NOTES
"    Established Office Visit      Patient Name: Kathleen T Luers  : 1995   MRN: [id number]   Care Team: Patient Care Team:  Jimena Desai DO as PCP - General (Internal Medicine)  Keven Feldman MD as Consulting Physician (Obstetrics and Gynecology)    Chief Complaint:    Chief Complaint   Patient presents with   • Rapid Heart Rate     Resting heart rate over 100 breast reduction x3 weeks ago        History of Present Illness: Kathleen T Luers is a 27 y.o. female with ADHD, anxiety, depression, migraines who is here today to follow up with concerns about heart rate.     Patient reports \"Apple watch has been alerting that my resting heart rate is over 100 since I had my breast reduction on 23. Checked manually and it is correct. Happening at night and during the day. Asymptomatic. My normal rate is between 70-80.\"    She has alert on her apple watch when resting HR is >100 for >10 minutes. This is happening several times throughout the day over the past few weeks while completely relaxed at rest.     She is taking adderall (switched from ritalin to adderall about 1 week prior to surgery) but even on days when she skips her dose, her resting heart rate is >100 and she notices her HR jump from 70 to >150 within a few seconds with exertion. No chest pain, SOA, palpitations.      She has open incisions under her breast and prefers not to do EKG unless needed for emergency    Obesity - she has lost 10 lb over the past 2 months. She has been taking ozempic 0.5mg weekly.     Subjective      Review of Systems:   Review of Systems - See HPI    I have reviewed and the following portions of the patient's history were updated as appropriate: past family history, past medical history, past social history, past surgical history and problem list.    Medications:     Current Outpatient Medications:   •  amphetamine-dextroamphetamine (ADDERALL) 20 MG tablet, Take 1 tablet by mouth Daily., Disp: , Rfl:   •  " "escitalopram (Lexapro) 10 MG tablet, Take 1 tablet by mouth Daily., Disp: 90 tablet, Rfl: 1  •  medroxyPROGESTERone Acetate 150 MG/ML suspension prefilled syringe, Inject 1 mL into the appropriate muscle as directed by prescriber Every 3 (Three) Months., Disp: 1 mL, Rfl: 1  •  Semaglutide,0.25 or 0.5MG/DOS, (OZEMPIC) 2 MG/1.5ML solution pen-injector, Inject 0.5 mg under the skin into the appropriate area as directed 1 (One) Time Per Week., Disp: 3 mL, Rfl: 0  •  topiramate (Topamax) 50 MG tablet, Take 1 tablet by mouth Daily., Disp: 30 tablet, Rfl: 5    Allergies:   Allergies   Allergen Reactions   • Morphine Shortness Of Breath   • Oxycodone-Acetaminophen Hallucinations       Objective     Physical Exam:  Vital Signs:   Vitals:    03/03/23 0745   BP: 118/80   Pulse: 100   SpO2: 98%   Weight: 90.3 kg (199 lb)   Height: 177.8 cm (70\")     Body mass index is 28.55 kg/m².     Physical Exam  Vitals reviewed.   Constitutional:       Appearance: Normal appearance. She is not ill-appearing.   Cardiovascular:      Rate and Rhythm: Regular rhythm. Tachycardia present.      Heart sounds: No murmur heard.  Pulmonary:      Effort: Pulmonary effort is normal. No respiratory distress.   Skin:     General: Skin is warm and dry.      Comments: Superficial dehiscence under bilateral breast with small amount of drainage   Neurological:      Mental Status: She is alert.   Psychiatric:         Mood and Affect: Mood normal.         Behavior: Behavior normal.         Judgment: Judgment normal.         Assessment / Plan      Assessment/Plan:   Problems Addressed This Visit  Diagnoses and all orders for this visit:    1. Tachycardia (Primary)  -     CBC No Differential; Future  -     Comprehensive metabolic panel; Future  -     Lipid Panel w/ Chol/HDL Ratio; Future  -     TSH Rfx On Abnormal To Free T4; Future  -     Vitamin B12; Future  Will obtain labs today to evaluate for anemia, thyroid dysfunction, electrolytes, metabolic " dysfunction.  Unable to obtain EKG today per patient preference - has superficial dehiscence under breasts and only wants to apply EKG stickers if it were to be an emergency. She is asymptomatic today.  Will plan to refer to heart and valve for EKG/holter if labs are unrevealing     2. Overweight (BMI 25.0-29.9)  -     CBC No Differential; Future  -     Comprehensive metabolic panel; Future  -     Lipid Panel w/ Chol/HDL Ratio; Future  -     TSH Rfx On Abnormal To Free T4; Future  -     Vitamin B12; Future  Improved - BMI 30.1 to 28.6 over the past 2 months  Previously followed with weight loss specialist and was provided Ozempic for weight loss. She has switched insurance policy and is unable to continue following with weight loss specialist and has requested Ozempic refills through me as she has had success with this and tolerates it well. She has been unsuccessful with dietary modifications and exercise alone. She understands Ozempic is FDA approved for T2DM and while she does not have this diagnosis, she would like to continue treatment for overweight. Discussed stopping this since she has achieved BMI <30 but she requests to continue. Will plan to continue for 1 month and reevaluate.      3. Vitamin B12 deficiency  -     Vitamin B12; Future    4. Mixed hyperlipidemia  Fasting lipid panel today    Plan of care reviewed with patient at the conclusion of today's visit. Education was provided regarding diagnosis and management.  Patient verbalizes understanding of and agreement with management plan.    Follow Up:   No follow-ups on file.        DO LASHANDA Oneil RD  [facility]  [address]  [facility] [address]  Fax [phone]  Phone [phone]

## 2023-03-04 LAB
BACTERIA SPEC AEROBE CULT: NORMAL
BACTERIA UR QL AUTO: ABNORMAL /HPF
COD CRY URNS QL: ABNORMAL /HPF
HYALINE CASTS UR QL AUTO: ABNORMAL /LPF
RBC # UR STRIP: ABNORMAL /HPF
REF LAB TEST METHOD: ABNORMAL
SQUAMOUS #/AREA URNS HPF: ABNORMAL /HPF
WBC # UR STRIP: ABNORMAL /HPF

## 2023-03-06 ENCOUNTER — PATIENT MESSAGE (OUTPATIENT)
Dept: FAMILY MEDICINE CLINIC | Facility: CLINIC | Age: 28
End: 2023-03-06
Payer: COMMERCIAL

## 2023-05-02 DIAGNOSIS — E66.9 OBESITY (BMI 30.0-34.9): ICD-10-CM

## 2023-05-03 RX ORDER — SEMAGLUTIDE 0.68 MG/ML
INJECTION, SOLUTION SUBCUTANEOUS
Qty: 3 ML | Refills: 1 | OUTPATIENT
Start: 2023-05-03

## 2023-05-03 NOTE — TELEPHONE ENCOUNTER
Rx Refill Note  Requested Prescriptions     Pending Prescriptions Disp Refills   • Ozempic, 0.25 or 0.5 MG/DOSE, 2 MG/3ML solution pen-injector [Pharmacy Med Name: OZEMPIC 0.25 OR 0.5MG/[id number] 3ML] 3 mL      Sig: INJECT 0.5 MG UNDER THE SKIN AS DIRECTED      Last office visit with prescribing clinician: 3/3/2023   Last telemedicine visit with prescribing clinician: Visit date not found   Next office visit with prescribing clinician: Visit date not found                         Would you like a call back once the refill request has been completed: [] Yes [] No    If the office needs to give you a call back, can they leave a voicemail: [] Yes [] No    Cathy Rivera MA  05/03/23, 09:56 EDT

## 2023-05-20 DIAGNOSIS — E66.9 OBESITY (BMI 30.0-34.9): ICD-10-CM

## 2023-05-20 DIAGNOSIS — G43.109 MIGRAINE WITH AURA AND WITHOUT STATUS MIGRAINOSUS, NOT INTRACTABLE: ICD-10-CM

## 2023-05-20 RX ORDER — TOPIRAMATE 50 MG/1
50 TABLET, FILM COATED ORAL DAILY
Qty: 30 TABLET | Refills: 5 | Status: SHIPPED | OUTPATIENT
Start: 2023-05-20

## 2023-05-20 NOTE — TELEPHONE ENCOUNTER
Rx Refill Note  Requested Prescriptions     Pending Prescriptions Disp Refills   • topiramate (Topamax) 50 MG tablet 30 tablet 5     Sig: Take 1 tablet by mouth Daily.      Last office visit with prescribing clinician: 3/3/2023   Last telemedicine visit with prescribing clinician: 5/2/2023   Next office visit with prescribing clinician: Visit date not found                         Would you like a call back once the refill request has been completed: [] Yes [] No    If the office needs to give you a call back, can they leave a voicemail: [] Yes [] No    Rosmery Serna MA  05/20/23, 10:54 EDT

## 2023-05-22 RX ORDER — MEDROXYPROGESTERONE ACETATE 150 MG/ML
150 INJECTION, SUSPENSION INTRAMUSCULAR
Qty: 1 ML | Refills: 1 | OUTPATIENT
Start: 2023-05-22

## 2023-05-22 RX ORDER — MEDROXYPROGESTERONE ACETATE 150 MG/ML
150 INJECTION, SUSPENSION INTRAMUSCULAR
Qty: 1 ML | Refills: 1 | Status: SHIPPED | OUTPATIENT
Start: 2023-05-22

## 2023-05-22 NOTE — TELEPHONE ENCOUNTER
Rx Refill Note  Requested Prescriptions     Pending Prescriptions Disp Refills   • medroxyPROGESTERone Acetate 150 MG/ML suspension prefilled syringe 1 mL 1     Sig: Inject 1 mL into the appropriate muscle as directed by prescriber Every 3 (Three) Months.      Last office visit with prescribing clinician: 3/3/2023   Last telemedicine visit with prescribing clinician: 5/20/2023   Next office visit with prescribing clinician: Visit date not found                         Would you like a call back once the refill request has been completed: [] Yes [] No    If the office needs to give you a call back, can they leave a voicemail: [] Yes [] No    Rosmery Serna MA  05/22/23, 13:44 EDT

## 2023-05-22 NOTE — TELEPHONE ENCOUNTER
Refilled, but please remind patient she needs to establish with gynecology for her long term contraceptive needs and routine women's health exams. She was referred previously and had an appointment in August 2022 that she no showed.

## 2023-05-22 NOTE — TELEPHONE ENCOUNTER
Rx Refill Note  Requested Prescriptions     Pending Prescriptions Disp Refills   • medroxyPROGESTERone Acetate 150 MG/ML suspension prefilled syringe 1 mL 1     Sig: Inject 1 mL into the appropriate muscle as directed by prescriber Every 3 (Three) Months.      Last office visit with prescribing clinician: 3/3/2023   Last telemedicine visit with prescribing clinician: 5/20/2023   Next office visit with prescribing clinician: 5/20/2023                         Would you like a call back once the refill request has been completed: [] Yes [] No    If the office needs to give you a call back, can they leave a voicemail: [] Yes [] No    Rosmery Serna MA  05/22/23, 08:02 EDT

## 2023-08-03 RX ORDER — SEMAGLUTIDE 1.34 MG/ML
INJECTION, SOLUTION SUBCUTANEOUS
Qty: 3 ML | Refills: 0 | Status: SHIPPED | OUTPATIENT
Start: 2023-08-03

## 2023-08-22 ENCOUNTER — OFFICE VISIT (OUTPATIENT)
Dept: FAMILY MEDICINE CLINIC | Facility: CLINIC | Age: 28
End: 2023-08-22
Payer: COMMERCIAL

## 2023-08-22 VITALS
SYSTOLIC BLOOD PRESSURE: 124 MMHG | WEIGHT: 186.6 LBS | OXYGEN SATURATION: 99 % | HEIGHT: 70 IN | BODY MASS INDEX: 26.71 KG/M2 | DIASTOLIC BLOOD PRESSURE: 80 MMHG | HEART RATE: 86 BPM

## 2023-08-22 DIAGNOSIS — J01.90 ACUTE NON-RECURRENT SINUSITIS, UNSPECIFIED LOCATION: Primary | ICD-10-CM

## 2023-08-22 DIAGNOSIS — E66.3 OVERWEIGHT (BMI 25.0-29.9): ICD-10-CM

## 2023-08-22 PROCEDURE — 99213 OFFICE O/P EST LOW 20 MIN: CPT | Performed by: STUDENT IN AN ORGANIZED HEALTH CARE EDUCATION/TRAINING PROGRAM

## 2023-08-22 RX ORDER — AMOXICILLIN AND CLAVULANATE POTASSIUM 875; 125 MG/1; MG/1
1 TABLET, FILM COATED ORAL 2 TIMES DAILY
Qty: 14 TABLET | Refills: 0 | Status: SHIPPED | OUTPATIENT
Start: 2023-08-22 | End: 2023-08-29

## 2023-08-22 RX ORDER — SEMAGLUTIDE 1.34 MG/ML
0.5 INJECTION, SOLUTION SUBCUTANEOUS WEEKLY
Qty: 3 ML | Refills: 2 | Status: SHIPPED | OUTPATIENT
Start: 2023-08-22

## 2023-08-22 NOTE — PROGRESS NOTES
Established Office Visit      Patient Name: Kathleen T Luers  : 1995   MRN: [id number]   Care Team: Patient Care Team:  Jimena Desai DO as PCP - General (Internal Medicine)  Keven Feldman MD as Consulting Physician (Obstetrics and Gynecology)    Chief Complaint:    Chief Complaint   Patient presents with    Obesity     Discuss medication     Sinusitis     X2 days        History of Present Illness: Kathleen T Luers is a 27 y.o. female with ADHD, anxiety, depression, migraines who is here today to follow up with overweight and sinus congestion.    She has lost 13lb over the past 5 months. She reports significant improvement in satiety and cravings. She has been following a healthy diet - incorporating more fruits, fresh foods from her garden, protein focused meals. Eating most meals at home. She went without Ozempic for one week and noticed significant increase in cravings/hunger.   She is riding horses for exercise.     She reports chronic allergies but over the past several weeks she has had significant sinus congestion. She has been taking zyrtec, flonase and benadryl without help. No fevers. She has facial and bilateral ear pain today.     Subjective      Review of Systems:   Review of Systems - See HPI    I have reviewed and the following portions of the patient's history were updated as appropriate: past family history, past medical history, past social history, past surgical history and problem list.    Medications:     Current Outpatient Medications:     amphetamine-dextroamphetamine (ADDERALL) 20 MG tablet, Take 1 tablet by mouth Daily., Disp: , Rfl:     amphetamine-dextroamphetamine XR (ADDERALL XR) 25 MG 24 hr capsule, , Disp: , Rfl:     escitalopram (Lexapro) 10 MG tablet, Take 1 tablet by mouth Daily., Disp: 90 tablet, Rfl: 1    medroxyPROGESTERone Acetate 150 MG/ML suspension prefilled syringe, Inject 1 mL into the appropriate muscle as directed by prescriber Every 3 (Three) Months.,  "Disp: 1 mL, Rfl: 1    QUEtiapine fumarate ER (SEROquel XR) 50 MG tablet sustained-release 24 hour tablet, Take 1-2 tablets by mouth At Night As Needed., Disp: , Rfl:     topiramate (Topamax) 50 MG tablet, Take 1 tablet by mouth Daily., Disp: 30 tablet, Rfl: 5    amoxicillin-clavulanate (AUGMENTIN) 875-125 MG per tablet, Take 1 tablet by mouth 2 (Two) Times a Day for 7 days., Disp: 14 tablet, Rfl: 0    Semaglutide,0.25 or 0.5MG/DOS, (Ozempic, 0.25 or 0.5 MG/DOSE,) 2 MG/1.5ML solution pen-injector, Inject 0.5 mg under the skin into the appropriate area as directed 1 (One) Time Per Week., Disp: 3 mL, Rfl: 2    Allergies:   Allergies   Allergen Reactions    Morphine Shortness Of Breath    Oxycodone-Acetaminophen Hallucinations       Objective     Physical Exam:  Vital Signs:   Vitals:    08/22/23 0752   BP: 124/80   Pulse: 86   SpO2: 99%   Weight: 84.6 kg (186 lb 9.6 oz)   Height: 177.8 cm (70\")     Body mass index is 26.77 kg/mý.     Physical Exam  Vitals reviewed.   Constitutional:       Appearance: Normal appearance.   HENT:      Ears:      Comments: Bilateral middle ear effusion without TM erythema, bulging or tenderness     Nose: Congestion and rhinorrhea present.      Comments: Bilateral maxillary fullness      Mouth/Throat:      Pharynx: No oropharyngeal exudate or posterior oropharyngeal erythema.   Cardiovascular:      Rate and Rhythm: Normal rate.      Pulses: Normal pulses.   Pulmonary:      Effort: Pulmonary effort is normal. No respiratory distress.   Skin:     General: Skin is warm and dry.   Neurological:      Mental Status: She is alert.   Psychiatric:         Mood and Affect: Mood normal.         Behavior: Behavior normal.         Judgment: Judgment normal.       Assessment / Plan      Assessment/Plan:   Problems Addressed This Visit  Diagnoses and all orders for this visit:    1. Acute non-recurrent sinusitis, unspecified location (Primary)  -     amoxicillin-clavulanate (AUGMENTIN) 875-125 MG per " tablet; Take 1 tablet by mouth 2 (Two) Times a Day for 7 days.  Dispense: 14 tablet; Refill: 0  Continue flonase, zyrtec daily. Add augmentin BID x 7 days for treatment of acute sinusitis. Will let me know if symptoms worsen or do not improve.   2. Overweight (BMI 25.0-29.9)  She understands Ozempic is FDA approved for T2DM and while she does not have this diagnosis, she would like to continue treatment for overweight. She had had success with Ozempic thus far, as proven by  BMI improvement from 30.1 to 26.8 over the past 8 months. She has been unable to achieve weight loss with dietary/lifestyle modifications alone until adding Ozempic. Would benefit from continued therapy to help with satiety, cravings, and maintaining healthy weight.     Plan of care reviewed with patient at the conclusion of today's visit. Education was provided regarding diagnosis and management.  Patient verbalizes understanding of and agreement with management plan.    Follow Up:   No follow-ups on file.        DO LASHANDA Oneil RD  [facility]  [address]  [facility] [address]  Fax [phone]  Phone [phone]

## 2023-10-20 DIAGNOSIS — G43.109 MIGRAINE WITH AURA AND WITHOUT STATUS MIGRAINOSUS, NOT INTRACTABLE: ICD-10-CM

## 2023-10-20 DIAGNOSIS — E66.3 OVERWEIGHT (BMI 25.0-29.9): Primary | ICD-10-CM

## 2023-10-20 RX ORDER — SEMAGLUTIDE 0.68 MG/ML
0.5 INJECTION, SOLUTION SUBCUTANEOUS WEEKLY
Qty: 3 ML | Refills: 2 | Status: SHIPPED | OUTPATIENT
Start: 2023-10-20

## 2023-10-20 RX ORDER — TOPIRAMATE 50 MG/1
50 TABLET, FILM COATED ORAL DAILY
Qty: 90 TABLET | Refills: 1 | Status: SHIPPED | OUTPATIENT
Start: 2023-10-20

## 2023-10-20 NOTE — TELEPHONE ENCOUNTER
Rx Refill Note  Requested Prescriptions     Pending Prescriptions Disp Refills    Ozempic, 0.25 or 0.5 MG/DOSE, 2 MG/3ML solution pen-injector [Pharmacy Med Name: OZEMPIC 0.25 OR 0.5MG/[id number] 3ML] 3 mL      Sig: INJECT 0.25MG UNDER THE SKIN IN THE APPROPRIATE AREA ONE TIME PER WEEK FOR 28 DAYS, THEN 0.5MG FOR 28 DAYS    topiramate (TOPAMAX) 50 MG tablet [Pharmacy Med Name: TOPIRAMATE 50MG TABLETS] 90 tablet      Sig: TAKE 1 TABLET BY MOUTH EVERY DAY      Last office visit with prescribing clinician: 8/22/2023   Last telemedicine visit with prescribing clinician: Visit date not found   Next office visit with prescribing clinician: Visit date not found                         Would you like a call back once the refill request has been completed: [] Yes [] No    If the office needs to give you a call back, can they leave a voicemail: [] Yes [] No    Rosmery Serna MA  10/20/23, 14:37 EDT

## 2023-10-23 ENCOUNTER — PATIENT MESSAGE (OUTPATIENT)
Dept: FAMILY MEDICINE CLINIC | Facility: CLINIC | Age: 28
End: 2023-10-23
Payer: COMMERCIAL

## 2023-11-12 RX ORDER — MEDROXYPROGESTERONE ACETATE 150 MG/ML
150 INJECTION, SUSPENSION INTRAMUSCULAR
Qty: 1 ML | Refills: 1 | Status: SHIPPED | OUTPATIENT
Start: 2023-11-12

## 2023-12-29 ENCOUNTER — LAB (OUTPATIENT)
Dept: LAB | Facility: HOSPITAL | Age: 28
End: 2023-12-29
Payer: COMMERCIAL

## 2023-12-29 ENCOUNTER — TELEPHONE (OUTPATIENT)
Dept: FAMILY MEDICINE CLINIC | Facility: CLINIC | Age: 28
End: 2023-12-29
Payer: COMMERCIAL

## 2023-12-29 DIAGNOSIS — R00.0 TACHYCARDIA: Primary | ICD-10-CM

## 2023-12-29 DIAGNOSIS — R00.0 TACHYCARDIA: ICD-10-CM

## 2023-12-29 LAB
ANION GAP SERPL CALCULATED.3IONS-SCNC: 12.9 MMOL/L (ref 5–15)
BUN SERPL-MCNC: 11 MG/DL (ref 6–20)
BUN/CREAT SERPL: 12.4 (ref 7–25)
CALCIUM SPEC-SCNC: 9.9 MG/DL (ref 8.6–10.5)
CHLORIDE SERPL-SCNC: 108 MMOL/L (ref 98–107)
CO2 SERPL-SCNC: 20.1 MMOL/L (ref 22–29)
CREAT SERPL-MCNC: 0.89 MG/DL (ref 0.57–1)
DEPRECATED RDW RBC AUTO: 36.2 FL (ref 37–54)
EGFRCR SERPLBLD CKD-EPI 2021: 90.7 ML/MIN/1.73
ERYTHROCYTE [DISTWIDTH] IN BLOOD BY AUTOMATED COUNT: 12.1 % (ref 12.3–15.4)
GLUCOSE SERPL-MCNC: 78 MG/DL (ref 65–99)
HCT VFR BLD AUTO: 45.8 % (ref 34–46.6)
HGB BLD-MCNC: 15.2 G/DL (ref 12–15.9)
MCH RBC QN AUTO: 28 PG (ref 26.6–33)
MCHC RBC AUTO-ENTMCNC: 33.2 G/DL (ref 31.5–35.7)
MCV RBC AUTO: 84.5 FL (ref 79–97)
PLATELET # BLD AUTO: 395 10*3/MM3 (ref 140–450)
PMV BLD AUTO: 10.3 FL (ref 6–12)
POTASSIUM SERPL-SCNC: 4.1 MMOL/L (ref 3.5–5.2)
RBC # BLD AUTO: 5.42 10*6/MM3 (ref 3.77–5.28)
SODIUM SERPL-SCNC: 141 MMOL/L (ref 136–145)
TSH SERPL DL<=0.05 MIU/L-ACNC: 1.34 UIU/ML (ref 0.27–4.2)
WBC NRBC COR # BLD AUTO: 6.4 10*3/MM3 (ref 3.4–10.8)

## 2023-12-29 PROCEDURE — 80048 BASIC METABOLIC PNL TOTAL CA: CPT

## 2023-12-29 PROCEDURE — 36415 COLL VENOUS BLD VENIPUNCTURE: CPT

## 2023-12-29 PROCEDURE — 85027 COMPLETE CBC AUTOMATED: CPT

## 2023-12-29 PROCEDURE — 84443 ASSAY THYROID STIM HORMONE: CPT

## 2023-12-29 NOTE — TELEPHONE ENCOUNTER
Left message for Kathleen T Luers ROI reviewed. Left detailed message documented below:    Hub may relay message and document    Jimena Mark, DO18 minutes ago (10:35 AM)       I have put in some basic labs to make sure blood counts, electrolytes, and thyroid function are all okay. This may be related to her medications (adderall, topiramate). I encourage her to go to [facility] today if she is still experiencing symptoms.

## 2023-12-29 NOTE — TELEPHONE ENCOUNTER
Pt called to say she is worried about increased heart rate. She woke up this morning with a resting heart rate of 117. She states she will go to urgent care, but wanted to ask if Dr. Mark wanted to order labs or anything. She is on the schedule to see Dr. Mark on Wednesday of next week. Please advise.

## 2023-12-29 NOTE — TELEPHONE ENCOUNTER
I have put in some basic labs to make sure blood counts, electrolytes, and thyroid function are all okay. This may be related to her medications (adderall, topiramate). I encourage her to go to [facility] today if she is still experiencing symptoms.

## 2024-01-02 ENCOUNTER — OFFICE VISIT (OUTPATIENT)
Dept: CARDIOLOGY | Facility: HOSPITAL | Age: 29
End: 2024-01-02
Payer: COMMERCIAL

## 2024-01-02 ENCOUNTER — HOSPITAL ENCOUNTER (OUTPATIENT)
Dept: CARDIOLOGY | Facility: HOSPITAL | Age: 29
Discharge: HOME | End: 2024-01-02
Payer: COMMERCIAL

## 2024-01-02 VITALS
SYSTOLIC BLOOD PRESSURE: 121 MMHG | RESPIRATION RATE: 20 BRPM | TEMPERATURE: 97.8 F | WEIGHT: 194 LBS | HEIGHT: 70 IN | OXYGEN SATURATION: 98 % | DIASTOLIC BLOOD PRESSURE: 74 MMHG | HEART RATE: 81 BPM | BODY MASS INDEX: 27.77 KG/M2

## 2024-01-02 DIAGNOSIS — R00.0 TACHYCARDIA: ICD-10-CM

## 2024-01-02 DIAGNOSIS — R06.09 DOE (DYSPNEA ON EXERTION): ICD-10-CM

## 2024-01-02 DIAGNOSIS — R53.83 OTHER FATIGUE: ICD-10-CM

## 2024-01-02 DIAGNOSIS — R00.0 TACHYCARDIA: Primary | ICD-10-CM

## 2024-01-02 PROCEDURE — 93246 EXT ECG>7D<15D RECORDING: CPT

## 2024-01-02 RX ORDER — DEXTROAMPHETAMINE 13.5 MG/1
1 PATCH, EXTENDED RELEASE TRANSDERMAL DAILY
COMMUNITY

## 2024-01-02 RX ORDER — QUETIAPINE FUMARATE 50 MG/1
50 TABLET, FILM COATED ORAL NIGHTLY
COMMUNITY

## 2024-01-02 NOTE — PROGRESS NOTES
[facility] Heart Monitor Documentation    Kathleen T Luers  1995  [id number]  01/02/24      [] ZIO XT Patch  Model [id number] Prescribed for N/A Days    Serial Number: (N + 9 Digits) N   Apply-By Date on Box:   USPS Tracking Number:   USPS Tracking        [] [employer] MINI PLUS Mobile Cardiac Telemetry  Model BGMINIPLUS Prescribed for N/A Days    Serial Number: (BGM + 7 Digits) BGM  Shipped-By Date on Box:   UPS Tracking Number: [id number]  UPS Tracking      [] [employer] MINI Holter Monitor  Model BGMINIEL Prescribed for 14 Days    Serial Number: (7 Digits) [id number]  Shipped-By Date on Box: 12/22/23  UPS Tracking Number: [id number]  UPS Tracking        This monitor was applied to the patient's chest and checked for proper functioning.  Ms. Kathleen T Luers was instructed in the proper use of this monitor.  She was given the opportunity to ask questions and left the office with the device 's instruction manual.    Barbara Ying MA, 10:56 EST, 01/02/24                  [facility] CenterITORDOCUMENTATION 8.8.2019

## 2024-01-02 NOTE — PROGRESS NOTES
"Chief Complaint  Rapid Heart Rate and Establish Care    Subjective    History of Present Illness {CC  Problem List  Visit  Diagnosis   Encounters  Notes  Medications  Labs  Result Review Imaging  Media :23}       History of Present Illness    28-year-old female presents the office today for ongoing evaluation of her tachycardia.  Patient presented to [facility] for ongoing evaluation of her tachycardia.  Previously had been on adderall but notes that had recently been discontinued.Patient reports tachycardia has been occurring for the past few weeks.  Reports she is seeing heart rates as high as 160 at rest.  She does report associated dyspnea on exertion and fatigue.  Father has a history of a murmur.  She reports she does deny syncope, dizziness or palpitations.  She does not consume any energy drinks and does drink only minimal coffee, recently  switching to decaf.  Objective     Vital Signs:   Vitals:    01/02/24 1015 01/02/24 1016 01/02/24 1017   BP: 125/77 126/81 121/74   BP Location: Right arm Left arm Left arm   Patient Position: Sitting Standing Sitting   Cuff Size: Adult Adult Adult   Pulse: 77 88 81   Resp:   20   Temp:   97.8 °F (36.6 °C)   TempSrc:   Temporal   SpO2: 97% 98% 98%   Weight:   88 kg (194 lb)   Height:   177.8 cm (70\")     Body mass index is 27.84 kg/m².  Physical Exam  Vitals and nursing note reviewed.   Constitutional:       Appearance: Normal appearance.   HENT:      Head: Normocephalic.   Eyes:      Pupils: Pupils are equal, round, and reactive to light.   Cardiovascular:      Rate and Rhythm: Normal rate and regular rhythm.      Pulses: Normal pulses.      Heart sounds: Normal heart sounds. No murmur heard.  Pulmonary:      Effort: Pulmonary effort is normal.      Breath sounds: Normal breath sounds.   Abdominal:      General: Bowel sounds are normal.      Palpations: Abdomen is soft.   Musculoskeletal:         General: Normal range of motion.      Cervical back: Normal range of " motion.      Right lower leg: No edema.      Left lower leg: No edema.   Skin:     General: Skin is warm and dry.      Capillary Refill: Capillary refill takes less than 2 seconds.   Neurological:      Mental Status: She is alert and oriented to person, place, and time.   Psychiatric:         Mood and Affect: Mood normal.         Thought Content: Thought content normal.              Result Review  Data Reviewed:{ Labs  Result Review  Imaging  Med Tab  Media :23}     ECG 12 Lead (12/29/2023 20:03)   TSH Rfx On Abnormal To Free T4 (12/29/2023 11:38)  CBC No Differential (12/29/2023 11:38)  Basic metabolic panel (12/29/2023 11:38)         Assessment and Plan {CC Problem List  Visit Diagnosis  ROS  Review (Popup)  Health Maintenance  Quality  BestPractice  Medications  SmartSets  SnapShot Encounters  Media :23}   1. Tachycardia    - Holter Monitor - 72 Hour Up To 15 Days; Future  - Adult Transthoracic Echo Complete W/ Cont if Necessary Per Protocol; Future    2. Other fatigue    - Adult Transthoracic Echo Complete W/ Cont if Necessary Per Protocol; Future    3. FARMER (dyspnea on exertion)  Echo in near future       Follow Up {Instructions Charge Capture  Follow-up Communications :23}   Return in about 4 weeks (around 1/30/2024) for Telemedicine visit, Monitor results.    Patient was given instructions and counseling regarding her condition or for health maintenance advice. Please see specific information pulled into the AVS if appropriate.  Patient was instructed to call the [facility] with any questions, concerns, or worsening symptoms.

## 2024-02-01 ENCOUNTER — TELEMEDICINE (OUTPATIENT)
Dept: CARDIOLOGY | Facility: HOSPITAL | Age: 29
End: 2024-02-01
Payer: COMMERCIAL

## 2024-02-02 VITALS
SYSTOLIC BLOOD PRESSURE: 120 MMHG | HEIGHT: 70 IN | DIASTOLIC BLOOD PRESSURE: 80 MMHG | BODY MASS INDEX: 27.77 KG/M2 | HEART RATE: 95 BPM | WEIGHT: 194 LBS

## 2024-02-02 NOTE — PROGRESS NOTES
"Chief Complaint  Follow-up (tachycardia)    Subjective    History of Present Illness {CC  Problem List  Visit  Diagnosis   Encounters  Notes  Medications  Labs  Result Review Imaging  Media :23}   You have chosen to receive care through the use of telemedicine. Telemedicine enables health care providers at different locations to provide safe, effective, and convenient care through the use of technology. As with any health care service, there are risks associated with the use of telemedicine, including equipment failure, poor connections, and  issues.    Do you understand the risks and benefits of telemedicine as I have explained them to you? Yes  Have your questions regarding telemedicine been answered? Yes  Do you consent to the use of telemedicine in your medical care today? Yes     History of Present Illness    28-year-old female presents for telehealth visit today for ongoing evaluation of her tachycardia.  Patient presented to [facility] for ongoing evaluation of her tachycardia.  Previously had been on po adderall but notes that had recently been discontinued and she has switched to the patch. Patient reports tachycardia has been occurring for the past few weeks.  Reports she had been seeing heart rates as high as 160 at rest. Notes that heart rates are now 105 at rest and around 140 with exertion. She does report associated dyspnea on exertion and fatigue.  Father has a history of a murmur.  She reports she does deny syncope, dizziness or palpitations.  She does not consume any energy drinks and does drink only minimal coffee, recently  switching to decaf.  Objective     Vital Signs:   Vitals:    02/01/24 0938   BP: 120/80   BP Location: Left arm   Patient Position: Sitting   Pulse: 95   Weight: 88 kg (194 lb)   Height: 177.8 cm (70\")     Body mass index is 27.84 kg/m².  Physical Exam  Vitals and nursing note reviewed.   Constitutional:       Appearance: Normal appearance.   HENT:      " Head: Normocephalic.   Pulmonary:      Effort: Pulmonary effort is normal.   Neurological:      Mental Status: She is alert and oriented to person, place, and time.   Psychiatric:         Mood and Affect: Mood normal.         Behavior: Behavior normal.         Thought Content: Thought content normal.            Result Review  Data Reviewed:{ Labs  Result Review  Imaging  Med Tab  Media :23}     ECG 12 Lead (12/29/2023 20:03)   TSH Rfx On Abnormal To Free T4 (12/29/2023 11:38)  CBC No Differential (12/29/2023 11:38)  Basic metabolic panel (12/29/2023 11:38)    Holter: avg hr 88 min 39, max 182 (ST) pac burden 0%  Pvc burden less than 1% with no arrhthymias      Assessment and Plan {CC Problem List  Visit Diagnosis  ROS  Review (Popup)  Health Maintenance  Quality  BestPractice  Medications  SmartSets  SnapShot Encounters  Media :23}   1. Tachycardia  May use prn metoprolol tartrate 12.5-25 mg po bid for tachycardia     2. Other fatigue  Improving   3. PAL (dyspnea on exertion)  Echo cancelled due to cost   Pal is improving      Follow Up {Instructions Charge Capture  Follow-up Communications :23}   Return in about 4 weeks (around 2/29/2024) for Telemedicine visit tachyfcardia .    Patient was given instructions and counseling regarding her condition or for health maintenance advice. Please see specific information pulled into the AVS if appropriate.  Patient was instructed to call the [facility] with any questions, concerns, or worsening symptoms.

## 2024-02-12 ENCOUNTER — OFFICE VISIT (OUTPATIENT)
Dept: OBSTETRICS AND GYNECOLOGY | Facility: CLINIC | Age: 29
End: 2024-02-12
Payer: COMMERCIAL

## 2024-02-12 VITALS
SYSTOLIC BLOOD PRESSURE: 138 MMHG | DIASTOLIC BLOOD PRESSURE: 90 MMHG | WEIGHT: 196.4 LBS | BODY MASS INDEX: 28.12 KG/M2 | HEIGHT: 70 IN

## 2024-02-12 DIAGNOSIS — Z01.419 ENCOUNTER FOR ANNUAL ROUTINE GYNECOLOGICAL EXAMINATION: Primary | ICD-10-CM

## 2024-02-12 DIAGNOSIS — Z87.42 HISTORY OF ABNORMAL CERVICAL PAP SMEAR: ICD-10-CM

## 2024-02-12 DIAGNOSIS — R68.82 LOW LIBIDO: ICD-10-CM

## 2024-02-12 PROCEDURE — 99385 PREV VISIT NEW AGE 18-39: CPT | Performed by: OBSTETRICS & GYNECOLOGY

## 2024-02-12 PROCEDURE — 99459 PELVIC EXAMINATION: CPT | Performed by: OBSTETRICS & GYNECOLOGY

## 2024-02-12 PROCEDURE — 99213 OFFICE O/P EST LOW 20 MIN: CPT | Performed by: OBSTETRICS & GYNECOLOGY

## 2024-02-12 RX ORDER — ZOLPIDEM TARTRATE 10 MG/1
TABLET, FILM COATED ORAL
COMMUNITY

## 2024-02-12 RX ORDER — QUETIAPINE FUMARATE 25 MG/1
25-50 TABLET, FILM COATED ORAL NIGHTLY PRN
COMMUNITY
Start: 2024-02-01

## 2024-02-20 LAB — REF LAB TEST METHOD: NORMAL

## 2024-02-29 ENCOUNTER — TELEMEDICINE (OUTPATIENT)
Dept: CARDIOLOGY | Facility: HOSPITAL | Age: 29
End: 2024-02-29
Payer: COMMERCIAL

## 2024-02-29 VITALS — WEIGHT: 196 LBS | HEART RATE: 80 BPM | HEIGHT: 70 IN | BODY MASS INDEX: 28.06 KG/M2

## 2024-02-29 DIAGNOSIS — R06.09 DOE (DYSPNEA ON EXERTION): ICD-10-CM

## 2024-02-29 DIAGNOSIS — R00.0 TACHYCARDIA: Primary | ICD-10-CM

## 2024-02-29 DIAGNOSIS — R53.83 OTHER FATIGUE: ICD-10-CM

## 2024-02-29 NOTE — PROGRESS NOTES
"Chief Complaint  Follow-up (Tachycardia )    Subjective    History of Present Illness {CC  Problem List  Visit  Diagnosis   Encounters  Notes  Medications  Labs  Result Review Imaging  Media :23}   You have chosen to receive care through the use of telemedicine. Telemedicine enables health care providers at different locations to provide safe, effective, and convenient care through the use of technology. As with any health care service, there are risks associated with the use of telemedicine, including equipment failure, poor connections, and  issues.    Do you understand the risks and benefits of telemedicine as I have explained them to you? Yes  Have your questions regarding telemedicine been answered? Yes  Do you consent to the use of telemedicine in your medical care today? Yes     History of Present Illness    28-year-old female presents for telehealth visit today for ongoing evaluation of her tachycardia.  Patient presented to [facility] for ongoing evaluation of her tachycardia.  Previously had been on po adderall but notes that had recently been discontinued and she has switched to the patch. Patient reports tachycardia has been occurring for the past few weeks.  Reports she had been seeing heart rates as high as 160 at rest. Notes that heart rates are now 105 at rest and around 140 with exertion.does have prn metoprolol tartrate to take. Notes that she has taken a few doses since last visit. Recently had covid and note that her heart rate was elevated intermittently.  She does report ongoing fatigue since covid. Father has a history of a murmur.  She reports she does deny syncope, dizziness or palpitations.  She does not consume any energy drinks and does drink only minimal coffee, recently  switching to decaf.  Objective     Vital Signs:   Vitals:    02/29/24 0946   Pulse: 80   Weight: 88.9 kg (196 lb)   Height: 177.8 cm (70\")     Body mass index is 28.12 kg/m².  Physical " Exam  Vitals and nursing note reviewed.   Constitutional:       Appearance: Normal appearance.   HENT:      Head: Normocephalic.   Pulmonary:      Effort: Pulmonary effort is normal.   Neurological:      Mental Status: She is alert and oriented to person, place, and time.   Psychiatric:         Mood and Affect: Mood normal.         Behavior: Behavior normal.         Thought Content: Thought content normal.              Result Review  Data Reviewed:{ Labs  Result Review  Imaging  Med Tab  Media :23}     ECG 12 Lead (12/29/2023 20:03)   TSH Rfx On Abnormal To Free T4 (12/29/2023 11:38)  CBC No Differential (12/29/2023 11:38)  Basic metabolic panel (12/29/2023 11:38)    Holter: avg hr 88 min 39, max 182 (ST) pac burden 0%  Pvc burden less than 1% with no arrhthymias      Assessment and Plan {CC Problem List  Visit Diagnosis  ROS  Review (Popup)  Health Maintenance  Quality  BestPractice  Medications  SmartSets  SnapShot Encounters  Media :23}   1. Tachycardia  May use prn metoprolol tartrate 12.5-25 mg po bid for tachycardia     2. Other fatigue  Now due to covid   3. PAL (dyspnea on exertion)  Echo cancelled due to cost   Pal is improving      Follow Up {Instructions Charge Capture  Follow-up Communications :23}   Return if symptoms worsen or fail to improve.    Patient was given instructions and counseling regarding her condition or for health maintenance advice. Please see specific information pulled into the AVS if appropriate.  Patient was instructed to call the [facility] with any questions, concerns, or worsening symptoms.

## 2024-03-19 DIAGNOSIS — E66.3 OVERWEIGHT (BMI 25.0-29.9): Primary | ICD-10-CM

## 2024-03-19 RX ORDER — SEMAGLUTIDE 0.68 MG/ML
INJECTION, SOLUTION SUBCUTANEOUS
Qty: 3 ML | Refills: 1 | Status: SHIPPED | OUTPATIENT
Start: 2024-03-19

## 2024-03-19 NOTE — TELEPHONE ENCOUNTER
Rx Refill Note  Requested Prescriptions     Pending Prescriptions Disp Refills    Ozempic, 0.25 or 0.5 MG/DOSE, 2 MG/3ML solution pen-injector [Pharmacy Med Name: OZEMPIC 0.25 OR 0.5MG/[id number] 3ML] 3 mL 2     Sig: INJECT 0.5 MG UNDER THE SKIN EVERY WEEK      Last office visit with prescribing clinician: 8/22/2023  Last telemedicine visit with prescribing clinician: Visit date not found   Next office visit with prescribing clinician: Visit date not found                         Would you like a call back once the refill request has been completed: [] Yes [] No    If the office needs to give you a call back, can they leave a voicemail: [] Yes [] No    Rosmery Serna MA  03/19/24, 10:35 EDT

## 2024-03-19 NOTE — TELEPHONE ENCOUNTER
Last visit on 2/29/24; refill sent to [facility].     Emily Luna, PharmD

## 2024-04-30 ENCOUNTER — TELEMEDICINE (OUTPATIENT)
Dept: FAMILY MEDICINE CLINIC | Facility: CLINIC | Age: 29
End: 2024-04-30
Payer: COMMERCIAL

## 2024-04-30 DIAGNOSIS — R19.7 DIARRHEA OF PRESUMED INFECTIOUS ORIGIN: ICD-10-CM

## 2024-04-30 DIAGNOSIS — R19.7 WATERY DIARRHEA: Primary | ICD-10-CM

## 2024-04-30 PROCEDURE — 99213 OFFICE O/P EST LOW 20 MIN: CPT | Performed by: NURSE PRACTITIONER

## 2024-04-30 NOTE — PROGRESS NOTES
"Subjective   Kathleen T Luers is a 28 y.o. female.   CC: diarrhea  You have chosen to receive care through a telehealth visit.  Do you consent to use a video/audio connection for your medical care today? Yes  Patient is at her home in [facility], provider is at [address] [place] [address]  Diarrhea   This is a new problem. Episode onset: x 3 days. The problem occurs 5 to 10 times per day. The problem has been unchanged. The stool consistency is described as Watery. Associated symptoms include abdominal pain (cramping) and chills. Pertinent negatives include no fever or vomiting. Risk factors: food from vendors at [employer], port-a potty use. She has tried change of diet and increased fluids for the symptoms. The treatment provided no relief.      Patient of Dr. Mark, video visit with complaint of watery diarrhea since Sunday. went to [employer] Sat and Sunday. Started having diarrhea 9510 times a day and then cramping. She did use port a potty at the [employer]  No one else ate same foods as she and no one else has similar symptoms  Eating bland diet, able to take in a lot of fluids.  Immediately feels urge to go to the bathroom after eating   Symptoms have been unchanged  Has been on same dose of ozempic for \"awhile\", no changes    The following portions of the patient's history were reviewed and updated as appropriate: allergies, current medications, past family history, past medical history, past social history, past surgical history, and problem list.    Review of Systems   Constitutional:  Positive for chills. Negative for fever.   Gastrointestinal:  Positive for abdominal pain (cramping), blood in stool (hemorrhoid), diarrhea and nausea. Negative for vomiting.   Genitourinary:  Negative for difficulty urinating and dysuria.   Neurological:  Negative for dizziness and headache.       Objective   Physical Exam  Constitutional:       General: She is not in acute distress.     Appearance: Normal appearance. " She is not ill-appearing, toxic-appearing or diaphoretic.   HENT:      Head: Normocephalic and atraumatic.   Pulmonary:      Effort: Pulmonary effort is normal. No respiratory distress.   Neurological:      Mental Status: She is alert and oriented to person, place, and time.   Psychiatric:         Thought Content: Thought content normal.         Judgment: Judgment normal.           Assessment & Plan   Problems Addressed this Visit    None  Visit Diagnoses       Watery diarrhea    -  Primary    Relevant Orders    Clostridioides difficile Toxin, PCR - Stool, Per Rectum    Gastrointestinal Panel, PCR - Stool, Per Rectum    Diarrhea of presumed infectious origin        Relevant Orders    Clostridioides difficile Toxin, PCR - Stool, Per Rectum    Gastrointestinal Panel, PCR - Stool, Per Rectum          Diagnoses         Codes Comments    Watery diarrhea    -  Primary ICD-10-CM: R19.7  ICD-9-CM: 787.91     Diarrhea of presumed infectious origin     ICD-10-CM: R19.7  ICD-9-CM: 009.3             Stool studies ordered, have these done if symptoms do not improve in a couple more days. Explained that often diarrhea, even infectious diarrhea is self-limiting  Continue with plenty of fluids, food choices to relieve diarrhea provided.  Patient was encouraged to keep me informed of any acute changes, lack of improvement, or any new concerning symptoms.

## 2024-06-08 DIAGNOSIS — G43.109 MIGRAINE WITH AURA AND WITHOUT STATUS MIGRAINOSUS, NOT INTRACTABLE: ICD-10-CM

## 2024-06-08 RX ORDER — MEDROXYPROGESTERONE ACETATE 150 MG/ML
150 INJECTION, SUSPENSION INTRAMUSCULAR
Qty: 1 ML | Refills: 1 | Status: CANCELLED | OUTPATIENT
Start: 2024-06-08

## 2024-06-10 RX ORDER — TOPIRAMATE 50 MG/1
50 TABLET, FILM COATED ORAL DAILY
Qty: 90 TABLET | Refills: 1 | Status: SHIPPED | OUTPATIENT
Start: 2024-06-10

## 2024-06-10 NOTE — TELEPHONE ENCOUNTER
Rx Refill Note  Requested Prescriptions     Pending Prescriptions Disp Refills    topiramate (TOPAMAX) 50 MG tablet [Pharmacy Med Name: TOPIRAMATE 50MG TABLETS] 90 tablet 1     Sig: TAKE 1 TABLET BY MOUTH EVERY DAY      Last office visit with prescribing clinician: Visit date not found   Last telemedicine visit with prescribing clinician: 4/30/2024   Next office visit with prescribing clinician: Visit date not found                         Would you like a call back once the refill request has been completed: [] Yes [] No    If the office needs to give you a call back, can they leave a voicemail: [] Yes [] No    Rosmery Serna MA  06/10/24, 09:12 EDT

## 2024-07-24 DIAGNOSIS — G43.109 MIGRAINE WITH AURA AND WITHOUT STATUS MIGRAINOSUS, NOT INTRACTABLE: ICD-10-CM

## 2024-07-24 DIAGNOSIS — Z30.42 DEPO-PROVERA CONTRACEPTIVE STATUS: Primary | ICD-10-CM

## 2024-07-24 RX ORDER — MEDROXYPROGESTERONE ACETATE 150 MG/ML
150 INJECTION, SUSPENSION INTRAMUSCULAR
Qty: 1 ML | Refills: 1 | Status: SHIPPED | OUTPATIENT
Start: 2024-07-24

## 2024-07-24 RX ORDER — TOPIRAMATE 50 MG/1
50 TABLET, FILM COATED ORAL DAILY
Qty: 30 TABLET | Refills: 1 | Status: SHIPPED | OUTPATIENT
Start: 2024-07-24

## 2024-07-24 RX ORDER — MEDROXYPROGESTERONE ACETATE 150 MG/ML
150 INJECTION, SUSPENSION INTRAMUSCULAR
Qty: 1 ML | Refills: 1 | Status: CANCELLED | OUTPATIENT
Start: 2024-07-24

## 2024-07-24 NOTE — TELEPHONE ENCOUNTER
Rx Refill Note  Requested Prescriptions     Pending Prescriptions Disp Refills    topiramate (TOPAMAX) 50 MG tablet [Pharmacy Med Name: TOPIRAMATE 50MG TABLETS] 90 tablet 1     Sig: TAKE 1 TABLET BY MOUTH EVERY DAY      Last office visit with prescribing clinician: 8/22/2023   Last telemedicine visit with prescribing clinician: 4/30/2024   Next office visit with prescribing clinician: Visit date not found                         Would you like a call back once the refill request has been completed: [] Yes [] No    If the office needs to give you a call back, can they leave a voicemail: [] Yes [] No    Rosmery Serna MA  07/24/24, 08:40 EDT

## 2024-09-16 ENCOUNTER — TRANSCRIBE ORDERS (OUTPATIENT)
Dept: ADMINISTRATIVE | Facility: HOSPITAL | Age: 29
End: 2024-09-16
Payer: COMMERCIAL

## 2024-09-16 ENCOUNTER — OFFICE VISIT (OUTPATIENT)
Dept: FAMILY MEDICINE CLINIC | Facility: CLINIC | Age: 29
End: 2024-09-16
Payer: COMMERCIAL

## 2024-09-16 VITALS
HEIGHT: 70 IN | OXYGEN SATURATION: 98 % | SYSTOLIC BLOOD PRESSURE: 114 MMHG | HEART RATE: 76 BPM | WEIGHT: 203.4 LBS | BODY MASS INDEX: 29.12 KG/M2 | DIASTOLIC BLOOD PRESSURE: 76 MMHG

## 2024-09-16 DIAGNOSIS — N61.0 CELLULITIS OF BREAST: Primary | ICD-10-CM

## 2024-09-16 DIAGNOSIS — N61.0 CELLULITIS OF RIGHT BREAST: Primary | ICD-10-CM

## 2024-09-16 PROCEDURE — 99214 OFFICE O/P EST MOD 30 MIN: CPT

## 2024-09-16 RX ORDER — CEPHALEXIN 500 MG/1
500 CAPSULE ORAL 2 TIMES DAILY
Qty: 14 CAPSULE | Refills: 0 | Status: SHIPPED | OUTPATIENT
Start: 2024-09-16 | End: 2024-09-23

## 2024-09-24 ENCOUNTER — HOSPITAL ENCOUNTER (OUTPATIENT)
Dept: ULTRASOUND IMAGING | Facility: HOSPITAL | Age: 29
Discharge: HOME | End: 2024-09-24
Payer: COMMERCIAL

## 2024-09-24 DIAGNOSIS — N61.0 CELLULITIS OF RIGHT BREAST: ICD-10-CM

## 2024-09-24 PROCEDURE — 76642 ULTRASOUND BREAST LIMITED: CPT | Performed by: RADIOLOGY

## 2024-09-24 PROCEDURE — 76642 ULTRASOUND BREAST LIMITED: CPT

## 2024-09-30 ENCOUNTER — LAB (OUTPATIENT)
Dept: LAB | Facility: HOSPITAL | Age: 29
End: 2024-09-30
Payer: COMMERCIAL

## 2024-09-30 ENCOUNTER — OFFICE VISIT (OUTPATIENT)
Dept: FAMILY MEDICINE CLINIC | Facility: CLINIC | Age: 29
End: 2024-09-30
Payer: COMMERCIAL

## 2024-09-30 VITALS
DIASTOLIC BLOOD PRESSURE: 80 MMHG | SYSTOLIC BLOOD PRESSURE: 118 MMHG | HEIGHT: 70 IN | WEIGHT: 208 LBS | OXYGEN SATURATION: 97 % | BODY MASS INDEX: 29.78 KG/M2 | HEART RATE: 80 BPM

## 2024-09-30 DIAGNOSIS — Z13.220 SCREENING FOR CHOLESTEROL LEVEL: ICD-10-CM

## 2024-09-30 DIAGNOSIS — Z23 IMMUNIZATION DUE: ICD-10-CM

## 2024-09-30 DIAGNOSIS — Z11.59 ENCOUNTER FOR HCV SCREENING TEST FOR LOW RISK PATIENT: ICD-10-CM

## 2024-09-30 DIAGNOSIS — Z13.1 SCREENING FOR DIABETES MELLITUS: ICD-10-CM

## 2024-09-30 DIAGNOSIS — Z12.11 COLON CANCER SCREENING: ICD-10-CM

## 2024-09-30 DIAGNOSIS — Z13.29 SCREENING FOR THYROID DISORDER: ICD-10-CM

## 2024-09-30 DIAGNOSIS — E66.3 OVERWEIGHT (BMI 25.0-29.9): ICD-10-CM

## 2024-09-30 DIAGNOSIS — Z83.719 FAMILY HISTORY OF POLYPS IN THE COLON: ICD-10-CM

## 2024-09-30 DIAGNOSIS — Z00.00 ANNUAL PHYSICAL EXAM: Primary | ICD-10-CM

## 2024-09-30 DIAGNOSIS — Z80.0 FAMILY HISTORY OF COLON CANCER: ICD-10-CM

## 2024-09-30 DIAGNOSIS — Z00.00 ANNUAL PHYSICAL EXAM: ICD-10-CM

## 2024-09-30 LAB
ALBUMIN SERPL-MCNC: 4.4 G/DL (ref 3.5–5.2)
ALBUMIN/GLOB SERPL: 1.9 G/DL
ALP SERPL-CCNC: 102 U/L (ref 39–117)
ALT SERPL W P-5'-P-CCNC: 17 U/L (ref 1–33)
ANION GAP SERPL CALCULATED.3IONS-SCNC: 10.2 MMOL/L (ref 5–15)
AST SERPL-CCNC: 20 U/L (ref 1–32)
BILIRUB SERPL-MCNC: 0.7 MG/DL (ref 0–1.2)
BUN SERPL-MCNC: 12 MG/DL (ref 6–20)
BUN/CREAT SERPL: 11.7 (ref 7–25)
CALCIUM SPEC-SCNC: 9.5 MG/DL (ref 8.6–10.5)
CHLORIDE SERPL-SCNC: 109 MMOL/L (ref 98–107)
CHOLEST SERPL-MCNC: 208 MG/DL (ref 0–200)
CO2 SERPL-SCNC: 19.8 MMOL/L (ref 22–29)
CREAT SERPL-MCNC: 1.03 MG/DL (ref 0.57–1)
DEPRECATED RDW RBC AUTO: 39.2 FL (ref 37–54)
EGFRCR SERPLBLD CKD-EPI 2021: 75.6 ML/MIN/1.73
ERYTHROCYTE [DISTWIDTH] IN BLOOD BY AUTOMATED COUNT: 12 % (ref 12.3–15.4)
GLOBULIN UR ELPH-MCNC: 2.3 GM/DL
GLUCOSE SERPL-MCNC: 88 MG/DL (ref 65–99)
HBA1C MFR BLD: 5.3 % (ref 4.8–5.6)
HCT VFR BLD AUTO: 46.8 % (ref 34–46.6)
HCV AB SER QL: NORMAL
HDLC SERPL-MCNC: 40 MG/DL (ref 40–60)
HGB BLD-MCNC: 15.1 G/DL (ref 12–15.9)
LDLC SERPL CALC-MCNC: 152 MG/DL (ref 0–100)
LDLC/HDLC SERPL: 3.77 {RATIO}
MCH RBC QN AUTO: 28.7 PG (ref 26.6–33)
MCHC RBC AUTO-ENTMCNC: 32.3 G/DL (ref 31.5–35.7)
MCV RBC AUTO: 89 FL (ref 79–97)
PLATELET # BLD AUTO: 348 10*3/MM3 (ref 140–450)
PMV BLD AUTO: 10.5 FL (ref 6–12)
POTASSIUM SERPL-SCNC: 4.8 MMOL/L (ref 3.5–5.2)
PROT SERPL-MCNC: 6.7 G/DL (ref 6–8.5)
RBC # BLD AUTO: 5.26 10*6/MM3 (ref 3.77–5.28)
SODIUM SERPL-SCNC: 139 MMOL/L (ref 136–145)
TRIGL SERPL-MCNC: 86 MG/DL (ref 0–150)
TSH SERPL DL<=0.05 MIU/L-ACNC: 2.9 UIU/ML (ref 0.27–4.2)
VLDLC SERPL-MCNC: 16 MG/DL (ref 5–40)
WBC NRBC COR # BLD AUTO: 8.55 10*3/MM3 (ref 3.4–10.8)

## 2024-09-30 PROCEDURE — 90472 IMMUNIZATION ADMIN EACH ADD: CPT | Performed by: STUDENT IN AN ORGANIZED HEALTH CARE EDUCATION/TRAINING PROGRAM

## 2024-09-30 PROCEDURE — 90471 IMMUNIZATION ADMIN: CPT | Performed by: STUDENT IN AN ORGANIZED HEALTH CARE EDUCATION/TRAINING PROGRAM

## 2024-09-30 PROCEDURE — 86803 HEPATITIS C AB TEST: CPT

## 2024-09-30 PROCEDURE — 80050 GENERAL HEALTH PANEL: CPT

## 2024-09-30 PROCEDURE — 83036 HEMOGLOBIN GLYCOSYLATED A1C: CPT

## 2024-09-30 PROCEDURE — 90715 TDAP VACCINE 7 YRS/> IM: CPT | Performed by: STUDENT IN AN ORGANIZED HEALTH CARE EDUCATION/TRAINING PROGRAM

## 2024-09-30 PROCEDURE — 90656 IIV3 VACC NO PRSV 0.5 ML IM: CPT | Performed by: STUDENT IN AN ORGANIZED HEALTH CARE EDUCATION/TRAINING PROGRAM

## 2024-09-30 PROCEDURE — 99395 PREV VISIT EST AGE 18-39: CPT | Performed by: STUDENT IN AN ORGANIZED HEALTH CARE EDUCATION/TRAINING PROGRAM

## 2024-09-30 PROCEDURE — 80061 LIPID PANEL: CPT

## 2024-09-30 RX ORDER — DEXTROAMPHETAMINE SULFATE 10 MG/1
TABLET ORAL
COMMUNITY
Start: 2024-09-25

## 2024-09-30 RX ORDER — SEMAGLUTIDE 0.68 MG/ML
INJECTION, SOLUTION SUBCUTANEOUS
Qty: 3 ML | Refills: 1 | Status: SHIPPED | OUTPATIENT
Start: 2024-09-30 | End: 2024-10-03

## 2024-09-30 NOTE — LETTER
[facility]  Vaccine Consent Form    Patient Name:  Kathleen T Luers  Patient :  1995     Vaccine(s) Ordered    Tdap Vaccine => 6yo IM (BOOSTRIX/ADACEL)  Fluzone >6mos ([id number])        Screening Checklist  The following questions should be completed prior to vaccination. If you answer “yes” to any question, it does not necessarily mean you should not be vaccinated. It just means we may need to clarify or ask more questions. If a question is unclear, please ask your healthcare provider to explain it.    Yes No   Any fever or moderate to severe illness today (mild illness and/or antibiotic treatment are not contraindications)?     Do you have a history of a serious reaction to any previous vaccinations, such as anaphylaxis, encephalopathy within 7 days, Guillain-Alpena syndrome within 6 weeks, seizure?     Have you received any live vaccine(s) (e.g MMR, DC) or any other vaccines in the last month (to ensure duplicate doses aren't given)?     Do you have an anaphylactic allergy to latex (DTaP, DTaP-IPV, Hep A, Hep B, MenB, RV, Td, Tdap), baker’s yeast (Hep B, HPV), polysorbates (RSV, nirsevimab, PCV 20, Rotavirrus, Tdap, Shingrix), or gelatin (DC, MMR)?     Do you have an anaphylactic allergy to neomycin (Rabies, DC, MMR, IPV, Hep A), polymyxin B (IPV), or streptomycin (IPV)?      Any cancer, leukemia, AIDS, or other immune system disorder? (DC, MMR, RV)     Do you have a parent, brother, or sister with an immune system problem (if immune competence of vaccine recipient clinically verified, can proceed)? (MMR, DC)     Any recent steroid treatments for >2 weeks, chemotherapy, or radiation treatment? (DC, MMR)     Have you received antibody-containing blood transfusions or IVIG in the past 11 months (recommended interval is dependent on product)? (MMR, DC)     Have you taken antiviral drugs (acyclovir, famciclovir, valacyclovir for DC) in the last 24 or 48 hours, respectively?      Are you pregnant or  "planning to become pregnant within 1 month? (DC, MMR, HPV, IPV, MenB, Abrexvy; For Hep B- refer to Engerix-B; For RSV - Abrysvo is indicated for 32-36 weeks of pregnancy from September to January)     For infants, have you ever been told your child has had intussusception or a medical emergency involving obstruction of the intestine (Rotavirus)? If not for an infant, can skip this question.         *Ordering Physicians/APC should be consulted if \"yes\" is checked by the patient or guardian above.  I have received, read, and understand the Vaccine Information Statement (VIS) for each vaccine ordered.  I have considered my or my child's health status as well as the health status of my close contacts.  I have taken the opportunity to discuss my vaccine questions with my or my child's health care provider.   I have requested that the ordered vaccine(s) be given to me or my child.  I understand the benefits and risks of the vaccines.  I understand that I should remain in the clinic for 15 minutes after receiving the vaccine(s).  _________________________________________________________  Signature of Patient or Parent/Legal Guardian ____________________  Date     "

## 2024-09-30 NOTE — PROGRESS NOTES
Physical Exam     Patient Name: Kathleen T Luers  : 1995   MRN: [id number]   Care Team: Patient Care Team:  Jimena Mark DO as PCP - General (Internal Medicine)  Keven Feldman MD as Consulting Physician (Obstetrics and Gynecology)  Prabhjot Orta MD as Gynecologist (Obstetrics and Gynecology)    Chief Complaint:    Chief Complaint   Patient presents with    Annual Exam       History of Present Illness: Kathleen T Luers is a 29 y.o. female who is presents today for annual healthcare maintenance.     Depression: PHQ-2 Depression Screening  PHQ-2 Depression Screening  Little interest or pleasure in doing things? 0-->not at all   Feeling down, depressed, or hopeless? 0-->not at all   PHQ-2 Total Score 0     She has been off of ozempic for 2 months. She continues to follow very healthy diet - eating only from home, does not eat junk food. Her appetite has increased, struggles with not feeling satiety. She has increased exercise - walking, running, weight training. Has gained about 12 lb over the past 2 months.     Health Maintenance   Topic Date Due    TDAP/TD VACCINES (2 - Td or Tdap) 2019    HEPATITIS C SCREENING  Never done    LIPID PANEL  2024    COVID-19 Vaccine (2023- season) 2024    INFLUENZA VACCINE  2024    ANNUAL PHYSICAL  2025    BMI FOLLOWUP  2025    PAP SMEAR  2027    Pneumococcal Vaccine 0-64  Aged Out       Subjective      Review of Systems:   Review of Systems - See HPI    Past Medical History:   Past Medical History:   Diagnosis Date    Abnormal ECG 2024    abnormal p-waves; followed up with cardiology and all is well    ADHD (attention deficit hyperactivity disorder)     Allergic     Anxiety     Depression     History of abnormal cervical Pap smear     21: LSIL; patient reports no follow up since    History of concussion     thrown from horse multiple times (since childhood)    History of dysmenorrhea     History of hemorrhoids      History of lump of left breast     post-surgical    History of medical problems 2/7    Bilateral breast reduction    History of menorrhagia     History of obesity in adulthood     History of varicella     Hyperlipidemia     Low back pain     Migraine headache with aura     Obesity     Ovarian cyst     Overactive bladder 04/26/2022    has since cleared (urology followed, and no longer needs to follow up for it)    Sciatica, left side     Syringohydromyelia     T3-T12; congenital    Tachycardia 01/02/2024       Past Surgical History:   Past Surgical History:   Procedure Laterality Date    BILATERAL BREAST REDUCTION Bilateral 02/07/2023    fat necrosis of left breast post-op; no follow-up surgical procedure needed    REDUCTION MAMMAPLASTY Bilateral 2023    WISDOM TOOTH EXTRACTION         Family History:   Family History   Problem Relation Age of Onset    Heart murmur Father     Anxiety disorder Mother     Asthma Mother     Depression Mother     Miscarriages / Stillbirths Mother         1    Mental illness Mother     Arthritis Paternal Grandfather     Prostate cancer Paternal Grandfather     Cancer Paternal Grandfather     Heart disease Paternal Grandfather     Hyperlipidemia Paternal Grandfather     Heart disease Maternal Grandfather     Hyperlipidemia Maternal Grandfather     Hypertension Maternal Grandfather     Colon cancer Maternal Grandfather     Diabetes Maternal Grandfather     Brain cancer Maternal Grandfather     Arthritis Maternal Grandfather     Alcohol abuse Maternal Aunt     Drug abuse Maternal Aunt     Lung cancer Maternal Aunt     Brain cancer Maternal Aunt     Cancer Maternal Aunt     Colon cancer Paternal Uncle     Cancer Paternal Uncle     Breast cancer Neg Hx     Ovarian cancer Neg Hx        Social History:   Social History     Socioeconomic History    Marital status: Single   Tobacco Use    Smoking status: Never     Passive exposure: Never    Smokeless tobacco: Never   Vaping Use    Vaping  "status: Never Used   Substance and Sexual Activity    Alcohol use: Yes     Comment: Very occasionally. Not weekly    Drug use: Never    Sexual activity: Yes     Partners: Male     Birth control/protection: Depo-provera       Tobacco History:   Social History     Tobacco Use   Smoking Status Never    Passive exposure: Never   Smokeless Tobacco Never       Medications:     Current Outpatient Medications:     Ambien 10 MG tablet, , Disp: , Rfl:     clonazePAM (KlonoPIN) 0.5 MG tablet, TAKE 1 AND 1/2 TABLETS BY MOUTH EVERY DAY AS NEEDED, Disp: , Rfl:     dextroamphetamine (DEXTROSTAT) 10 MG tablet, , Disp: , Rfl:     escitalopram (Lexapro) 10 MG tablet, Take 1 tablet by mouth Daily., Disp: 90 tablet, Rfl: 1    medroxyPROGESTERone Acetate 150 MG/ML suspension prefilled syringe, Inject 1 mL into the appropriate muscle as directed by prescriber Every 3 (Three) Months., Disp: 1 mL, Rfl: 1    metoprolol tartrate (LOPRESSOR) 25 MG tablet, TAKE 1/2 TO 1 TABLET BY MOUTH TWICE DAILY AS NEEDED FOR FAST HEART RATE, Disp: 60 tablet, Rfl: 3    QUEtiapine (SEROquel) 25 MG tablet, Take 1-2 tablets by mouth At Night As Needed., Disp: , Rfl:     Semaglutide,0.25 or 0.5MG/DOS, (Ozempic, 0.25 or 0.5 MG/DOSE,) 2 MG/3ML solution pen-injector, Inject 0.25 mg under the skin into the appropriate area as directed 1 (One) Time Per Week for 28 days, THEN 0.5 mg 1 (One) Time Per Week for 56 days., Disp: 3 mL, Rfl: 1    topiramate (TOPAMAX) 50 MG tablet, TAKE 1 TABLET BY MOUTH EVERY DAY, Disp: 30 tablet, Rfl: 1    Allergies:   Allergies   Allergen Reactions    Morphine Shortness Of Breath    Oxycodone-Acetaminophen Hallucinations       Past Medical History, Social History, Family History and Care Team were all reviewed with patient and updated as appropriate.       Objective     Physical Exam:  Vital Signs:   Vitals:    09/30/24 0759   BP: 118/80   Pulse: 80   SpO2: 97%   Weight: 94.3 kg (208 lb)   Height: 177.8 cm (70\")     Body mass index is " 29.84 kg/m².     Physical Exam  Vitals reviewed.   Constitutional:       Appearance: Normal appearance. She is not ill-appearing.   Cardiovascular:      Rate and Rhythm: Normal rate and regular rhythm.      Heart sounds: Normal heart sounds. No murmur heard.  Pulmonary:      Effort: Pulmonary effort is normal. No respiratory distress.      Breath sounds: Normal breath sounds.   Abdominal:      General: Abdomen is flat. There is no distension.      Palpations: Abdomen is soft.      Tenderness: There is no abdominal tenderness.   Skin:     General: Skin is warm and dry.   Neurological:      Mental Status: She is alert.   Psychiatric:         Mood and Affect: Mood normal.         Behavior: Behavior normal.         Judgment: Judgment normal.         Assessment / Plan      Assessment/Plan:   Problems Addressed This Visit  Diagnoses and all orders for this visit:    1. Annual physical exam (Primary)  -     CBC (No Diff); Future  -     Lipid Panel; Future  -     Hemoglobin A1c; Future  -     Comprehensive Metabolic Panel; Future  -     TSH; Future  -     Hepatitis C Antibody; Future    2. Overweight (BMI 25.0-29.9)  -     CBC (No Diff); Future  -     Lipid Panel; Future  -     Hemoglobin A1c; Future  -     Comprehensive Metabolic Panel; Future  -     TSH; Future  -     Hepatitis C Antibody; Future  -     Semaglutide,0.25 or 0.5MG/DOS, (Ozempic, 0.25 or 0.5 MG/DOSE,) 2 MG/3ML solution pen-injector; Inject 0.25 mg under the skin into the appropriate area as directed 1 (One) Time Per Week for 28 days, THEN 0.5 mg 1 (One) Time Per Week for 56 days.  Dispense: 3 mL; Refill: 1    3. Screening for diabetes mellitus  -     Hemoglobin A1c; Future    4. Screening for cholesterol level  -     Lipid Panel; Future    5. Screening for thyroid disorder  -     TSH; Future    6. Encounter for HCV screening test for low risk patient  -     Hepatitis C Antibody; Future    7. Family history of colon cancer  -     Ambulatory Referral For  Screening Colonoscopy    8. Colon cancer screening  -     Ambulatory Referral For Screening Colonoscopy    9. Family history of polyps in the colon  -     Ambulatory Referral For Screening Colonoscopy    10. Immunization due  -     Tdap Vaccine => 8yo IM (BOOSTRIX/ADACEL)  -     Fluzone >6mos ([id number])        Healthcare Maintenance   Vaccines:  Tdap today  Influenza today  Covid booster encouraged     Cancer Screening  -Mammogram due at age 40  -Colon cancer screening. Family history of colon cancer in paternal uncle (age 30s), maternal grandfather (unknown age). Precancerous polyps in both mom and dad. No personal bowel habit changes but admits to intermittent bright  -Pap smear ASCUS with HPV positive - following up with GYN for colpo    Other  -PHQ score 0  -Counseled on importance of maintaining healthy diet and routine exercise. Encouraged 150 min exercise per week.  -Tobacco cessation: not indicated, nonsmoker  -Contraception - depo provera   -Blood pressure is at goal <130/80  -Metabolic screening today    Encouraged routine eye and dental exams.     Overweight - has had success with ozempic in the past, stopped 2 months ago and weight has returned. Will restart this to aide with appetite suppression and satiety. Counseled patient on importance of weight loss and maintaining healthy lifestyle. Recommended calorie deficit and 150 minutes of exercise per week.      Plan of care reviewed with patient at the conclusion of today's visit. Education was provided regarding diagnosis and management.  Patient verbalizes understanding of and agreement with management plan.    Follow Up:   Return in about 3 months (around 12/30/2024) for Recheck weight .        DO LASHANDA Cole RD  [facility]  [address]  [facility] [address]  Fax [phone]  Phone [phone]

## 2024-09-30 NOTE — LETTER
[facility]  Vaccine Consent Form    Patient Name:  Kathleen T Luers  Patient :  1995     Vaccine(s) Ordered    Tdap Vaccine => 6yo IM (BOOSTRIX/ADACEL)  Fluzone >6mos ([id number])        Screening Checklist  The following questions should be completed prior to vaccination. If you answer “yes” to any question, it does not necessarily mean you should not be vaccinated. It just means we may need to clarify or ask more questions. If a question is unclear, please ask your healthcare provider to explain it.    Yes No   Any fever or moderate to severe illness today (mild illness and/or antibiotic treatment are not contraindications)?     Do you have a history of a serious reaction to any previous vaccinations, such as anaphylaxis, encephalopathy within 7 days, Guillain-Bivalve syndrome within 6 weeks, seizure?     Have you received any live vaccine(s) (e.g MMR, DC) or any other vaccines in the last month (to ensure duplicate doses aren't given)?     Do you have an anaphylactic allergy to latex (DTaP, DTaP-IPV, Hep A, Hep B, MenB, RV, Td, Tdap), baker’s yeast (Hep B, HPV), polysorbates (RSV, nirsevimab, PCV 20, Rotavirrus, Tdap, Shingrix), or gelatin (DC, MMR)?     Do you have an anaphylactic allergy to neomycin (Rabies, DC, MMR, IPV, Hep A), polymyxin B (IPV), or streptomycin (IPV)?      Any cancer, leukemia, AIDS, or other immune system disorder? (DC, MMR, RV)     Do you have a parent, brother, or sister with an immune system problem (if immune competence of vaccine recipient clinically verified, can proceed)? (MMR, DC)     Any recent steroid treatments for >2 weeks, chemotherapy, or radiation treatment? (DC, MMR)     Have you received antibody-containing blood transfusions or IVIG in the past 11 months (recommended interval is dependent on product)? (MMR, DC)     Have you taken antiviral drugs (acyclovir, famciclovir, valacyclovir for DC) in the last 24 or 48 hours, respectively?      Are you pregnant or  "planning to become pregnant within 1 month? (DC, MMR, HPV, IPV, MenB, Abrexvy; For Hep B- refer to Engerix-B; For RSV - Abrysvo is indicated for 32-36 weeks of pregnancy from September to January)     For infants, have you ever been told your child has had intussusception or a medical emergency involving obstruction of the intestine (Rotavirus)? If not for an infant, can skip this question.         *Ordering Physicians/APC should be consulted if \"yes\" is checked by the patient or guardian above.  I have received, read, and understand the Vaccine Information Statement (VIS) for each vaccine ordered.  I have considered my or my child's health status as well as the health status of my close contacts.  I have taken the opportunity to discuss my vaccine questions with my or my child's health care provider.   I have requested that the ordered vaccine(s) be given to me or my child.  I understand the benefits and risks of the vaccines.  I understand that I should remain in the clinic for 15 minutes after receiving the vaccine(s).  _________________________________________________________  Signature of Patient or Parent/Legal Guardian ____________________  Date     "

## 2024-10-02 ENCOUNTER — TELEPHONE (OUTPATIENT)
Dept: GASTROENTEROLOGY | Facility: CLINIC | Age: 29
End: 2024-10-02
Payer: COMMERCIAL

## 2024-10-02 ENCOUNTER — PATIENT MESSAGE (OUTPATIENT)
Dept: FAMILY MEDICINE CLINIC | Facility: CLINIC | Age: 29
End: 2024-10-02
Payer: COMMERCIAL

## 2024-10-02 RX ORDER — SODIUM, POTASSIUM,MAG SULFATES 17.5-3.13G
1 SOLUTION, RECONSTITUTED, ORAL ORAL TAKE AS DIRECTED
Qty: 354 ML | Refills: 0 | Status: SHIPPED | OUTPATIENT
Start: 2024-10-02

## 2024-10-02 NOTE — TELEPHONE ENCOUNTER
ABIGAIL ADD-ON 10.10 NEEDS BOWEL PREP CALLED IN PLEASE, TO:    [id number] - [place], [place] - [address]  [place] [facility] [place] [phone] [facility] [phone] FX     PLEASE AND THANK YOU

## 2024-10-03 ENCOUNTER — PRIOR AUTHORIZATION (OUTPATIENT)
Dept: FAMILY MEDICINE CLINIC | Facility: CLINIC | Age: 29
End: 2024-10-03

## 2024-10-03 ENCOUNTER — TELEMEDICINE (OUTPATIENT)
Dept: FAMILY MEDICINE CLINIC | Facility: CLINIC | Age: 29
End: 2024-10-03
Payer: COMMERCIAL

## 2024-10-03 VITALS — BODY MASS INDEX: 29.84 KG/M2 | HEIGHT: 70 IN

## 2024-10-03 DIAGNOSIS — E66.3 OVERWEIGHT (BMI 25.0-29.9): Primary | ICD-10-CM

## 2024-10-03 PROCEDURE — 99213 OFFICE O/P EST LOW 20 MIN: CPT | Performed by: STUDENT IN AN ORGANIZED HEALTH CARE EDUCATION/TRAINING PROGRAM

## 2024-10-03 RX ORDER — BUPROPION HYDROCHLORIDE 150 MG/1
150 TABLET ORAL DAILY
Qty: 30 TABLET | Refills: 0 | Status: SHIPPED | OUTPATIENT
Start: 2024-10-03

## 2024-10-03 RX ORDER — NALTREXONE HYDROCHLORIDE 50 MG/1
TABLET, FILM COATED ORAL
Qty: 30 TABLET | Refills: 0 | Status: SHIPPED | OUTPATIENT
Start: 2024-10-03

## 2024-10-03 NOTE — TELEPHONE ENCOUNTER
Garcia: [id number]) - [id number]  Ozempic (0.25 or 0.5 MG/DOSE) 2MG/3ML pen-injectors  status: Question Response - N/ACreated: October 3rd, 2024    Information regarding your request  Product not covered for this health plan/disease state/medication as submitted. Product not covered by this plan for this diagnosis. For a FDA label approved diagnosis, please resubmit with an ICD 10 code or submit via other methods.    Associated Diagnoses: Overweight (BMI 25.0-29.9) [E66.3]

## 2024-10-03 NOTE — PROGRESS NOTES
Virtual Video Visit      Date: 10/03/2024   Patient Name: Kathleen T Luers  : 1995   MRN: [id number]     Chief Complaint:  No chief complaint on file.      I have reviewed the E-Visit questionnaire and the patient's answers, my assessment and plan are listed below.     This provider is located at the home address on file with [facility]. using a secure Homejoyt Video Visit through [employer]. Patient is being seen remotely via telehealth at their home address in [place], and stated they are in a secure environment for this session. The patient's condition being diagnosed/treated is appropriate for telemedicine. The provider identified herself as well as her credentials. The patient, and/or patients guardian, consent to be seen remotely, and when consent is given they understand that the consent allows for patient identifiable information to be sent to a third party as needed. They may refuse to be seen remotely at any time. The electronic data is encrypted and password protected, and the patient and/or guardian has been advised of the potential risks to privacy not withstanding such measures.    You have chosen to receive care through a virtual video visit. Do you consent to use a video/audio connection for your medical care today? Yes    History of Present Illness: Kathleen T Luers is a 29 y.o. female who is seen today to follow up with weight.    As detailed during our last visit - she has been off of ozempic for about 2 months. She continues to follow very healthy diet - eating only from home, does not eat junk food. Her appetite has increased, struggles with not feeling satiety. She has increased exercise - walking, running, weight training. Has gained about 12 lb over the past 2 months.   Insurance is no longer approving her Ozempic due to lacking appropriate FDA approved diagnosis. Her insurance company does not cover Wegovy or Zepbound for weight management. She is interested in  "alternative options.     Subjective      Review of Systems:   Review of Systems    I have reviewed and the following portions of the patient's history were updated as appropriate: past family history, past medical history, past social history, past surgical history and problem list.    Medications:     Current Outpatient Medications:     Ambien 10 MG tablet, , Disp: , Rfl:     clonazePAM (KlonoPIN) 0.5 MG tablet, TAKE 1 AND 1/2 TABLETS BY MOUTH EVERY DAY AS NEEDED, Disp: , Rfl:     dextroamphetamine (DEXTROSTAT) 10 MG tablet, , Disp: , Rfl:     escitalopram (Lexapro) 10 MG tablet, Take 1 tablet by mouth Daily., Disp: 90 tablet, Rfl: 1    medroxyPROGESTERone Acetate 150 MG/ML suspension prefilled syringe, Inject 1 mL into the appropriate muscle as directed by prescriber Every 3 (Three) Months., Disp: 1 mL, Rfl: 1    metoprolol tartrate (LOPRESSOR) 25 MG tablet, TAKE 1/2 TO 1 TABLET BY MOUTH TWICE DAILY AS NEEDED FOR FAST HEART RATE, Disp: 60 tablet, Rfl: 3    QUEtiapine (SEROquel) 25 MG tablet, Take 1-2 tablets by mouth At Night As Needed., Disp: , Rfl:     sodium-potassium-magnesium sulfates (Suprep Bowel Prep Kit) 17.5-3.13-1.6 GM/177ML solution oral solution, Take 1 bottle by mouth Take As Directed., Disp: 354 mL, Rfl: 0    topiramate (TOPAMAX) 50 MG tablet, TAKE 1 TABLET BY MOUTH EVERY DAY, Disp: 30 tablet, Rfl: 1    buPROPion XL (Wellbutrin XL) 150 MG 24 hr tablet, Take 1 tablet by mouth Daily., Disp: 30 tablet, Rfl: 0    naltrexone (DEPADE) 50 MG tablet, Take half tablet once daily for 2 weeks then increase to half tablet twice daily, Disp: 30 tablet, Rfl: 0    Allergies:   Allergies   Allergen Reactions    Morphine Shortness Of Breath    Oxycodone-Acetaminophen Hallucinations       Objective     Physical Exam:  Vital Signs:   Vitals:    10/03/24 1330   Height: 177.8 cm (70\")     Body mass index is 29.84 kg/m².    Physical Exam  Vitals reviewed.   Constitutional:       Appearance: Normal appearance. "   Pulmonary:      Effort: Pulmonary effort is normal. No respiratory distress.   Neurological:      Mental Status: She is alert.   Psychiatric:         Mood and Affect: Mood normal.         Behavior: Behavior normal.         Judgment: Judgment normal.           Assessment / Plan      Assessment/Plan:   Diagnoses and all orders for this visit:    1. Overweight (BMI 25.0-29.9) (Primary)  -     naltrexone (DEPADE) 50 MG tablet; Take half tablet once daily for 2 weeks then increase to half tablet twice daily  Dispense: 30 tablet; Refill: 0  -     buPROPion XL (Wellbutrin XL) 150 MG 24 hr tablet; Take 1 tablet by mouth Daily.  Dispense: 30 tablet; Refill: 0    Discussion regarding medications available to aide with weight loss. Unfortunately GLP1/GIP not an option due to lack of insurance coverage and financial burden. Phentermine not an option while on stimulant therapy for ADHD. Discussed Contrave, which her insurance does not cover but would be able to afford individual components. We will start Naltrexone-Wellbutrin to help aide with weight loss in addition to ongoing dietary modifications and exercising regularly. Discussed potential side effects and she will let me know if she experiences any. Specifically discussed wellbutrin and it's potential impact on mental health which she plans to discuss with her psychiatrist for approval prior to initiating.     Follow Up: as currently scheduled in 3 months for weight follow up   No follow-ups on file.    Any medications prescribed have been sent electronically to patient's preferred pharmacy     Jimena Mark DO  [facility]  10/03/24  13:35 EDT

## 2024-10-10 ENCOUNTER — OUTSIDE FACILITY SERVICE (OUTPATIENT)
Dept: GASTROENTEROLOGY | Facility: CLINIC | Age: 29
End: 2024-10-10
Payer: COMMERCIAL

## 2024-10-10 PROCEDURE — 45378 DIAGNOSTIC COLONOSCOPY: CPT | Performed by: INTERNAL MEDICINE

## 2024-11-08 DIAGNOSIS — E66.3 OVERWEIGHT (BMI 25.0-29.9): ICD-10-CM

## 2024-11-08 RX ORDER — BUPROPION HYDROCHLORIDE 150 MG/1
150 TABLET ORAL DAILY
Qty: 30 TABLET | Refills: 0 | Status: SHIPPED | OUTPATIENT
Start: 2024-11-08

## 2024-12-31 ENCOUNTER — TELEMEDICINE (OUTPATIENT)
Dept: FAMILY MEDICINE CLINIC | Facility: CLINIC | Age: 29
End: 2024-12-31
Payer: COMMERCIAL

## 2024-12-31 VITALS — WEIGHT: 197 LBS | BODY MASS INDEX: 28.2 KG/M2 | HEIGHT: 70 IN

## 2024-12-31 DIAGNOSIS — R35.0 URINARY FREQUENCY: ICD-10-CM

## 2024-12-31 DIAGNOSIS — E66.3 OVERWEIGHT (BMI 25.0-29.9): Primary | ICD-10-CM

## 2024-12-31 RX ORDER — DEXTROMETHORPHAN HYDROBROMIDE, BUPROPION HYDROCHLORIDE 105; 45 MG/1; MG/1
TABLET, MULTILAYER, EXTENDED RELEASE ORAL
COMMUNITY

## 2024-12-31 NOTE — PROGRESS NOTES
Virtual Video Visit      Date: 2024   Patient Name: Kathleen T Luers  : 1995   MRN: [id number]     Chief Complaint:    Chief Complaint   Patient presents with    Obesity       I have reviewed the E-Visit questionnaire and the patient's answers, my assessment and plan are listed below.     This provider is located at the home address on file with [facility]. using a secure [employer] Video Visit through [employer]. Patient is being seen remotely via telehealth at their home address in [place], and stated they are in a secure environment for this session. The patient's condition being diagnosed/treated is appropriate for telemedicine. The provider identified herself as well as her credentials. The patient, and/or patients guardian, consent to be seen remotely, and when consent is given they understand that the consent allows for patient identifiable information to be sent to a third party as needed. They may refuse to be seen remotely at any time. The electronic data is encrypted and password protected, and the patient and/or guardian has been advised of the potential risks to privacy not withstanding such measures.    You have chosen to receive care through a virtual video visit. Do you consent to use a video/audio connection for your medical care today? Yes    History of Present Illness: Kathleen T Luers is a 29 y.o. female who is seen today to follow up with obesity.    Last seen about 3 months ago. At that time she was struggling with increased appetite, difficulty with portion control, cravings. We started contrave but she had to stop this shortly after due to adverse side effects to naltrexone. She was experiencing fatigue and SOA which resolved once d/c medication.     She is following with dietician at [facility] and reports this has been going very well. She has lost 9 lb. She is exercising regularly.     Following with behavioral health for medication management of psychiatric  medications. She started Auvelity about 2 months ago and feel mood has improved somewhat. Denies SI. Still a work in progress.     She reports frequent urination and went to an [facility] recently for this. She was given course of bactrim but reports urine did not show evidence of uti. She completed course without improvement in symptoms. 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 Wonders if this is similar.      Subjective      Review of Systems:   Review of Systems    I have reviewed and the following portions of the patient's history were updated as appropriate: past family history, past medical history, past social history, past surgical history and problem list.    Medications:     Current Outpatient Medications:     Ambien 10 MG tablet, , Disp: , Rfl:     Auvelity  MG tablet controlled-release, , Disp: , Rfl:     clonazePAM (KlonoPIN) 0.5 MG tablet, TAKE 1 AND 1/2 TABLETS BY MOUTH EVERY DAY AS NEEDED, Disp: , Rfl:     dextroamphetamine (DEXTROSTAT) 10 MG tablet, , Disp: , Rfl:     escitalopram (Lexapro) 10 MG tablet, Take 1 tablet by mouth Daily., Disp: 90 tablet, Rfl: 1    medroxyPROGESTERone Acetate 150 MG/ML suspension prefilled syringe, Inject 1 mL into the appropriate muscle as directed by prescriber Every 3 (Three) Months., Disp: 1 mL, Rfl: 1    metoprolol tartrate (LOPRESSOR) 25 MG tablet, TAKE 1/2 TO 1 TABLET BY MOUTH TWICE DAILY AS NEEDED FOR FAST HEART RATE, Disp: 60 tablet, Rfl: 3    QUEtiapine (SEROquel) 25 MG tablet, Take 1-2 tablets by mouth At Night As Needed., Disp: , Rfl:     topiramate (TOPAMAX) 50 MG tablet, TAKE 1 TABLET BY MOUTH EVERY DAY, Disp: 30 tablet, Rfl: 1    sodium-potassium-magnesium sulfates (Suprep Bowel Prep Kit) 17.5-3.13-1.6 GM/177ML solution oral solution, Take 1 bottle by mouth Take As Directed., Disp: 354 mL, Rfl: 0    Allergies:   Allergies   Allergen Reactions    Morphine Shortness Of Breath     "Oxycodone-Acetaminophen Hallucinations       Objective     Physical Exam:  Vital Signs:   Vitals:    12/31/24 0829   Weight: 89.4 kg (197 lb)   Height: 177.8 cm (70\")     Body mass index is 28.27 kg/m².    Physical Exam  Vitals reviewed.   Constitutional:       Appearance: Normal appearance. She is not ill-appearing.   Pulmonary:      Effort: Pulmonary effort is normal. No respiratory distress.   Neurological:      Mental Status: She is alert.   Psychiatric:         Mood and Affect: Mood normal.         Behavior: Behavior normal.         Judgment: Judgment normal.           Assessment / Plan      Assessment/Plan:   Diagnoses and all orders for this visit:    1. Overweight (BMI 25.0-29.9) (Primary)  Improving with lifestyle modifications, guidance from nutritionist and exercising regularly. Congratulated on weight loss and encouraged her to continue consistent efforts.     2. 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 She has recently completed course of bactrim without resolution of symptoms. Will order urine culture along with mycoplasma/ureaplasma culture to r/o persistent infection.        Follow Up: prn if symptoms persist or worsen, otherwise due for annual in 9/2025  No follow-ups on file.    Any medications prescribed have been sent electronically to patient's preferred pharmacy     Jimena Mark DO  [facility]  12/31/24  08:40 EST  "

## 2025-01-21 ENCOUNTER — LAB (OUTPATIENT)
Dept: LAB | Facility: HOSPITAL | Age: 30
End: 2025-01-21
Payer: COMMERCIAL

## 2025-01-21 DIAGNOSIS — R35.0 URINARY FREQUENCY: ICD-10-CM

## 2025-01-21 PROCEDURE — 87086 URINE CULTURE/COLONY COUNT: CPT

## 2025-01-21 PROCEDURE — 87109 MYCOPLASMA: CPT

## 2025-01-23 ENCOUNTER — PATIENT MESSAGE (OUTPATIENT)
Dept: FAMILY MEDICINE CLINIC | Facility: CLINIC | Age: 30
End: 2025-01-23
Payer: COMMERCIAL

## 2025-01-23 DIAGNOSIS — R35.0 URINARY FREQUENCY: Primary | ICD-10-CM

## 2025-01-23 LAB — BACTERIA SPEC AEROBE CULT: NO GROWTH

## 2025-01-29 LAB
M HOMINIS SPEC QL CULT: NEGATIVE
U UREALYTICUM SPEC QL CULT: NEGATIVE

## 2025-02-12 NOTE — TELEPHONE ENCOUNTER
PATIENT HAS CALLED REQUESTING IF SHE CAN HAVE NOTE THAT IS IN HER CHART RECOMMENDING BREAST REDUCTION FAXED [phone]    CALL BACK NUMBER [phone]   GENERAL: [ ]Cachexia    [ ]Alert  [ ]Oriented    [x ]Lethargic Sleeping  [ ]Unarousable  [x ] minimally Verbal  [ ]Non-Verbal  Behavioral:   [ ]Anxiety  [ ]Delirium [ ]Agitation [ ]Other  HEENT:  [ ]Normal   [ x]Dry mouth   [ ]ET Tube/Trach  [ ]Oral lesions  PULMONARY:   [x]Clear [ ]Tachypnea  [ ] Mildly Audible secretions   [ ]Rhonchi        [ ]Right [ ]Left [ ]Bilateral  [ ]Crackles        [ ]Right [ ]Left [ ]Bilateral  [ ]Wheezing     [ ]Right [ ]Left [ ]Bilateral  [ ]Diminished BS [ ] Right [ ]Left [ ]Bilateral  CARDIOVASCULAR:    [ ]Regular [x ]Irregular [ ]Tachy  [ ]Gerson [ ]Murmur [ ]Other  GASTROINTESTINAL:  [ x]Soft  [ ]Distended   [ x]+BS  [x ]Non tender [ ]Tender  [x ]Other [ ]PEG [ ]OGT/ NGT   Last BM: Ostomy with stool output  GENITOURINARY:  [ ]Normal [ x]Incontinent   [ ]Oliguria/Anuria   [ ]Melvin  MUSCULOSKELETAL:   [ ]Normal   [x ]Weakness  [ x]Bed/Wheelchair bound [ ]Edema  NEUROLOGIC:   [ ]No focal deficits  [ x] Cognitive impairment  [ ] Dysphagia [ ]Dysarthria [ ] Paresis [ ]Other   SKIN:   [ ]Normal  [ ]Rash  [x]Other RN NOTE ON SKIN CARE REVIEWED  [ ]Pressure ulcer(s) [ ]y [ ]n present on admission

## 2025-03-08 DIAGNOSIS — Z30.42 DEPO-PROVERA CONTRACEPTIVE STATUS: ICD-10-CM

## 2025-03-27 RX ORDER — MEDROXYPROGESTERONE ACETATE 150 MG/ML
INJECTION, SUSPENSION INTRAMUSCULAR
Qty: 1 ML | Refills: 0 | Status: SHIPPED | OUTPATIENT
Start: 2025-03-27

## 2025-03-31 DIAGNOSIS — G43.109 MIGRAINE WITH AURA AND WITHOUT STATUS MIGRAINOSUS, NOT INTRACTABLE: ICD-10-CM

## 2025-03-31 RX ORDER — TOPIRAMATE 50 MG/1
50 TABLET, FILM COATED ORAL DAILY
Qty: 90 TABLET | Refills: 0 | Status: SHIPPED | OUTPATIENT
Start: 2025-03-31

## 2025-04-28 ENCOUNTER — TRANSCRIBE ORDERS (OUTPATIENT)
Dept: ADMINISTRATIVE | Facility: HOSPITAL | Age: 30
End: 2025-04-28
Payer: COMMERCIAL

## 2025-04-28 ENCOUNTER — HOSPITAL ENCOUNTER (OUTPATIENT)
Dept: GENERAL RADIOLOGY | Facility: HOSPITAL | Age: 30
Discharge: HOME | End: 2025-04-28
Admitting: FAMILY MEDICINE
Payer: COMMERCIAL

## 2025-04-28 DIAGNOSIS — M79.672 LEFT FOOT PAIN: Primary | ICD-10-CM

## 2025-04-28 DIAGNOSIS — M79.672 LEFT FOOT PAIN: ICD-10-CM

## 2025-04-28 PROCEDURE — 73630 X-RAY EXAM OF FOOT: CPT

## 2025-07-01 DIAGNOSIS — G43.109 MIGRAINE WITH AURA AND WITHOUT STATUS MIGRAINOSUS, NOT INTRACTABLE: ICD-10-CM

## 2025-07-02 RX ORDER — TOPIRAMATE 50 MG/1
50 TABLET, FILM COATED ORAL DAILY
Qty: 90 TABLET | Refills: 0 | Status: SHIPPED | OUTPATIENT
Start: 2025-07-02

## 2025-07-02 NOTE — TELEPHONE ENCOUNTER
Rx Refill Note  Requested Prescriptions     Pending Prescriptions Disp Refills    topiramate (TOPAMAX) 50 MG tablet [Pharmacy Med Name: TOPIRAMATE 50MG TABLETS] 90 tablet 0     Sig: TAKE 1 TABLET BY MOUTH EVERY DAY      Last office visit with prescribing clinician: 9/30/2024   Last telemedicine visit with prescribing clinician: Visit date not found   Next office visit with prescribing clinician: Visit date not found                         Would you like a call back once the refill request has been completed: [] Yes [] No    If the office needs to give you a call back, can they leave a voicemail: [] Yes [] No    Marion Mott MA  07/02/25, 13:52 EDT

## 2025-07-15 ENCOUNTER — OFFICE VISIT (OUTPATIENT)
Dept: OBSTETRICS AND GYNECOLOGY | Facility: CLINIC | Age: 30
End: 2025-07-15
Payer: COMMERCIAL

## 2025-07-15 VITALS
HEIGHT: 70 IN | BODY MASS INDEX: 28.29 KG/M2 | DIASTOLIC BLOOD PRESSURE: 76 MMHG | WEIGHT: 197.6 LBS | SYSTOLIC BLOOD PRESSURE: 110 MMHG

## 2025-07-15 DIAGNOSIS — R87.610 ASCUS WITH POSITIVE HIGH RISK HPV CERVICAL: ICD-10-CM

## 2025-07-15 DIAGNOSIS — Z30.430 ENCOUNTER FOR IUD INSERTION: ICD-10-CM

## 2025-07-15 DIAGNOSIS — Z01.419 ENCOUNTER FOR ANNUAL ROUTINE GYNECOLOGICAL EXAMINATION: Primary | ICD-10-CM

## 2025-07-15 DIAGNOSIS — R87.810 ASCUS WITH POSITIVE HIGH RISK HPV CERVICAL: ICD-10-CM

## 2025-07-15 NOTE — PROGRESS NOTES
Gynecologic Annual Exam Note        Gynecologic Exam        Subjective     HPI  Kathleen T Luers is a 29 y.o.  female who presents for annual well woman exam as a established patient. There were no changes to her medical or surgical history since her last visit. No LMP recorded. Patient has had an injection. Her periods are absent secondary to birth control.   Marital Status: Engaged. She is sexually active. She has not had new partners. STD testing recommendations have been explained to the patient and she does not desire STD testing.     The patient would like to discuss the following complaints today: None    Additional OB/GYN History   contraceptive methods: Depo-Provera injections  Desires to: continue contraception  Thromboembolic Disease:  family history - mother started getting blood clots post-COVID infection; maternal grandmother with history of blood clots unrelated to COVID  History of migraines: yes with aura  Age of menarche: 12    History of STD: no    Last Pap : 2024. Results: ASCUS. HPV: HPV pool +.   Last Completed Pap Smear            Awaiting Completion       PAP SMEAR (Every 3 Years) Order placed this encounter      07/15/2025  Order placed for LIQUID-BASED PAP SMEAR WITH HPV GENOTYPING REGARDLESS OF INTERPRETATION ([facility]) by Prabhjot Orta MD    2024  LIQUID-BASED PAP SMEAR WITH HPV GENOTYPING IF ASCUS ([facility])    2021  Done - [facility]                             History of abnormal Pap smear: yes - her first abnormal pap smear was 2021; she did not follow up with colposcopy or repeat pap smear  Gardasil status:completed  Family history of uterine, colon, breast, or ovarian cancer: yes - colon cancer in maternal grandfather and paternal uncle  Performs monthly Self-Breast Exam: yes (post-surgical lump in left breast)   Exercises Regularly:yes  Feelings of Anxiety or Depression: yes - controlled; seeing psych  Tobacco Usage?: No       Current Outpatient  Medications:     Ambien 10 MG tablet, , Disp: , Rfl:     clonazePAM (KlonoPIN) 0.5 MG tablet, TAKE 1 AND 1/2 TABLETS BY MOUTH EVERY DAY AS NEEDED, Disp: , Rfl:     dextroamphetamine (DEXTROSTAT) 10 MG tablet, , Disp: , Rfl:     escitalopram (Lexapro) 10 MG tablet, Take 1 tablet by mouth Daily., Disp: 90 tablet, Rfl: 1    QUEtiapine (SEROquel) 25 MG tablet, Take 1-2 tablets by mouth At Night As Needed., Disp: , Rfl:     topiramate (TOPAMAX) 50 MG tablet, TAKE 1 TABLET BY MOUTH EVERY DAY, Disp: 90 tablet, Rfl: 0    Levonorgestrel (MIRENA) 20 MCG/DAY intrauterine device IUD, To be inserted one time by prescriber. Route intrauterine., Disp: , Rfl:     sodium-potassium-magnesium sulfates (Suprep Bowel Prep Kit) 17.5-3.13-1.6 GM/177ML solution oral solution, Take 1 bottle by mouth Take As Directed., Disp: 354 mL, Rfl: 0    Current Facility-Administered Medications:     Levonorgestrel (MIRENA) 20 MCG/DAY IUD intrauterine device 1 each, 1 each, Intrauterine, Once, Prabhjot Orta MD     Patient denies the need for medication refills today.    OB History          0    Para   0    Term   0       0    AB   0    Living   0         SAB   0    IAB   0    Ectopic   0    Molar   0    Multiple   0    Live Births   0                Health Maintenance   Topic Date Due    COVID-19 Vaccine ( season) 2024    Annual Gynecologic Pelvic and Breast Exam  2025    ANNUAL PHYSICAL  2025    LIPID PANEL  2025    INFLUENZA VACCINE  10/01/2025    PAP SMEAR  2027    TDAP/TD VACCINES (3 - Td or Tdap) 2034    HEPATITIS C SCREENING  Completed    Pneumococcal Vaccine 0-49  Aged Out       Past Medical History:   Diagnosis Date    Abnormal ECG 2024    abnormal p-waves; followed up with cardiology and all is well    ADHD (attention deficit hyperactivity disorder)     Allergic     Anxiety     Depression     History of abnormal cervical Pap smear     21: [facility]; patient reports no follow up  "since    History of concussion     thrown from horse multiple times (since childhood)    History of dysmenorrhea     History of hemorrhoids     History of lump of left breast     post-surgical    History of medical problems 2/7    Bilateral breast reduction    History of menorrhagia     History of obesity in adulthood     History of varicella     Hyperlipidemia     Low back pain     Migraine headache with aura     Obesity     Ovarian cyst     Overactive bladder 04/26/2022    has since cleared (urology followed, and no longer needs to follow up for it)    Sciatica, left side     Syringohydromyelia     T3-T12; congenital    Tachycardia 01/02/2024        Past Surgical History:   Procedure Laterality Date    BILATERAL BREAST REDUCTION Bilateral 02/07/2023    fat necrosis of left breast post-op; no follow-up surgical procedure needed    REDUCTION MAMMAPLASTY Bilateral 2023    WISDOM TOOTH EXTRACTION         The additional following portions of the patient's history were reviewed and updated as appropriate: allergies, current medications, past family history, past medical history, past social history, past surgical history, and problem list.    Review of Systems      I have reviewed and agree with the HPI, ROS, and historical information as entered above. Prabhjot Orta MD         Objective   /76   Ht 177.8 cm (70\")   Wt 89.6 kg (197 lb 9.6 oz)   BMI 28.35 kg/m²     Physical Exam  Vitals and nursing note reviewed. Exam conducted with a chaperone present.   Constitutional:       General: She is not in acute distress.     Appearance: Normal appearance. She is well-developed.   HENT:      Head: Normocephalic and atraumatic.   Pulmonary:      Effort: Pulmonary effort is normal.   Chest:   Breasts:     Right: Normal. No mass, nipple discharge or skin change.      Left: Normal. No mass, nipple discharge or skin change.      Comments: Diffuse fibrocystic changes/scar tissue from breast reduction  Abdominal:      " General: There is no distension.      Palpations: Abdomen is soft. Abdomen is not rigid. There is no mass.      Tenderness: There is no abdominal tenderness. There is no guarding or rebound.   Genitourinary:     Exam position: Lithotomy position.      Labia:         Right: No lesion.         Left: No lesion.       Vagina: Normal. No vaginal discharge.      Cervix: Normal. No cervical motion tenderness or lesion.      Uterus: Normal.       Adnexa: Right adnexa normal and left adnexa normal.        Right: No tenderness or fullness.          Left: No tenderness or fullness.        Comments: Cervix: acetowhite lesion(s) noted at 12 o'clock; .  Musculoskeletal:         General: Normal range of motion.   Lymphadenopathy:      Upper Body:      Right upper body: No axillary adenopathy.      Left upper body: No axillary adenopathy.   Skin:     General: Skin is warm and dry.   Neurological:      General: No focal deficit present.      Mental Status: She is alert and oriented to person, place, and time.   Psychiatric:         Mood and Affect: Mood normal.         Behavior: Behavior normal.            Assessment and Plan    Problem List Items Addressed This Visit    None  Visit Diagnoses         Encounter for annual routine gynecological examination    -  Primary    Relevant Orders    LIQUID-BASED PAP SMEAR WITH HPV GENOTYPING REGARDLESS OF INTERPRETATION ([facility])      Encounter for IUD insertion        Relevant Medications    Levonorgestrel (MIRENA) 20 MCG/DAY intrauterine device IUD    Levonorgestrel (MIRENA) 20 MCG/DAY IUD intrauterine device 1 each      ASCUS with positive high risk HPV cervical        Relevant Orders    TISSUE EXAM, [facility] ([facility])            GYN annual well woman exam.   Reviewed pap guidelines.   Encouraged use of condoms for STD prevention.  Fibrocystic breast changes - Encouraged decreasing caffeine, supportive bra, low dose vitamin E supplementation.  Recommended use of Vitamin D  "replacement and getting adequate calcium in her diet. (1500mg)  Reviewed monthly self breast exams.  Instructed to call with lumps, pain, or breast discharge.    Reviewed exercise as a preventative health measures.   Return in about 4 weeks (around 8/12/2025) for String Check.    Prabhjot Orta MD  07/15/2025       Gynecologic Procedure Note        Procedure: IUD Insertion     Procedures    Pre procedure indication 1) Desires Mirena  Post procedure indication 1) Desires Mirena    NDC: Mirena 29313-195-05  Lot #: [id number]  Exp Date: 08/2027  BH device    /76   Ht 177.8 cm (70\")   Wt 89.6 kg (197 lb 9.6 oz)   BMI 28.35 kg/m²       The risks, benefits, and alternatives to Mirena were explained at length with the patient. All her questions were answered and consents were signed.  Her LMP was absent secondary to birth control .  Urine Pregnancy Test was Not done.  Patient does not have an allergy to betadine or shellfish.    Time out: immediate members of the procedure team and patient agree to the following: correct patient, correct site, correct procedure to be performed. Prabhjot Orta MD     The patient was placed in a dorsal lithotomy position on the examining table in [facility]. A bimanual exam confirmed the uterus was anteverted. A speculum was inserted into the vagina and the cervix was brought into view.  A paracervical block was performed with 10 cc of 1% lidocaine with epinephrine.  We then proceeded with a colposcopy, see below procedure note.  The cervix was prepped with Betadine. The anterior lip of the cervix was then grasped with a single-tooth tenaculum. The endometrial cavity was then sounded to 6.5 centimeters. The sealed Mirena package was opened and the IUD was removed in a sterile fashion.    The upper edge of the depth setting the flange was set at the uterine sound measurement. The  was then carefully advanced to the cervical canal into the uterus to the level of the fundus.  The " slider was then retracted about 1 cm and deployed the device. The device was then gently advanced to the fundus. The IUD was then released by pulling the slider down all the way. The  was removed carefully from the uterus. The threads were then cut leaving 2-3 cm visible outside of the cervix.  The single-tooth tenaculum was removed from the anterior lip. Good hemostasis was noted.   All other instruments were removed from the vagina.       The patient tolerated the procedure very well with a mild amount of discomfort.  She was monitored for 5 minutes prior to discharge.      There were no complications.    The patient was counseled about the need to return in 4 weeks for IUD check.     She was counseled about the need to use a backup method of contraception such as condoms until her post insertion exam was performed. The patient verbalized understanding when the IUD will need to be removed/replaced. Written information was given to the patient.  The patient is counseled to contact us if she has any significant or increasing bleeding, pain, fever, chills, or other concerns. She is instructed to see a doctor right away if she believes that she may be pregnant at any time with the IUD in place.    Return in about 4 weeks (around 2025) for String Check.      Prabhjot Orta MD  07/15/2025          Colposcopy Procedure Note        Procedures    Indications: Kathleen T Luers is a 29 y.o. female, , whose No LMP recorded. Patient has had an injection..  She presents for follow up for evaluation of an abnormal PAP smear that showed ASCUS with POSITIVE high risk HPV. Prior cervical treatment includes: none. She understands the need for the procedure and is aware of the complications, including post-colposcopic vaginal bleeding, vaginal leukorrhea or cervicitis.  She is aware she may experience discomfort.  After being presented with the risk, benefits, and alternatives the patient wished to proceed.   "    Urine pregnancy test done in the office today was Negative.      The patient has had Gardasil.  She is not a smoker.      Procedure Details   The risks and benefits of the procedure and Verbal informed consent obtained. Time out: immediate members of the procedure team and patient agree to the following: correct patient, correct site, correct procedure to be performed. Prabhjot Orta MD     She was positioned in the dorsal lithotomy position and a speculum was inserted into the vagina and excellent visualization of cervix achieved, cervix swabbed x 3 with acetic acid solution. The transformation zone was completely visualized.  A cervical biopsy was obtained at 12 o'clock.  An endocervical curettage was performed.  This colposcopy was satisfactory.  We then proceeded with IUD insertion.  See above note    Findings:  The procedure was notable for:  See above Physical exam  /76   Ht 177.8 cm (70\")   Wt 89.6 kg (197 lb 9.6 oz)   BMI 28.35 kg/m²       Specimens: 12:00 ECC  Specimens labelled and sent to Pathology.    Complications: none.    The patient tolerated the procedure very well and with mild discomfort and bleeding.         Will base further treatment on Pathology findings.  Treatment options discussed with patient.  Post biopsy instructions given to patient.  Repeat Pap in 12 months.    Prabhjot Orta MD  07/15/2025          "

## 2025-07-17 ENCOUNTER — TELEPHONE (OUTPATIENT)
Dept: OBSTETRICS AND GYNECOLOGY | Facility: CLINIC | Age: 30
End: 2025-07-17
Payer: COMMERCIAL

## 2025-07-17 LAB — REF LAB TEST METHOD: NORMAL

## 2025-07-17 NOTE — TELEPHONE ENCOUNTER
Pt called and stated she had a colposcopy done on Tuesday and states she is still in a lot of pain and is worried something is not right. Pt has also sent a [employer] message earlier today

## 2025-07-17 NOTE — TELEPHONE ENCOUNTER
Called patient she stated that she has had llq pain that gets better with different positions. Denies heavy bleeding. Added on for us and to see np tomorrow. If worsens before go to ED VU

## 2025-07-18 ENCOUNTER — OFFICE VISIT (OUTPATIENT)
Dept: OBSTETRICS AND GYNECOLOGY | Facility: CLINIC | Age: 30
End: 2025-07-18
Payer: COMMERCIAL

## 2025-07-18 VITALS
HEIGHT: 55 IN | BODY MASS INDEX: 46.47 KG/M2 | SYSTOLIC BLOOD PRESSURE: 110 MMHG | WEIGHT: 200.8 LBS | DIASTOLIC BLOOD PRESSURE: 70 MMHG

## 2025-07-18 DIAGNOSIS — R10.2 PELVIC PAIN: Primary | ICD-10-CM

## 2025-07-18 DIAGNOSIS — N83.201 CYST OF RIGHT OVARY: ICD-10-CM

## 2025-07-18 DIAGNOSIS — Z30.432 ENCOUNTER FOR IUD REMOVAL: ICD-10-CM

## 2025-07-18 DIAGNOSIS — Z30.9 ENCOUNTER FOR CONTRACEPTIVE MANAGEMENT, UNSPECIFIED TYPE: ICD-10-CM

## 2025-07-18 DIAGNOSIS — Z30.431 IUD CHECK UP: ICD-10-CM

## 2025-07-18 DIAGNOSIS — N89.8 VAGINAL DISCHARGE: ICD-10-CM

## 2025-07-18 LAB — REF LAB TEST METHOD: NORMAL

## 2025-07-18 NOTE — PROGRESS NOTES
Abdominal Pain, Back Pain, Vaginal Pain and Pressure (Since IUD insertion), and Pelvic Pressure        HPI  Kathleen T Luers is a 29 y.o. female, , who presents for initial evaluation evaluation of pelvic pain.      The pain is located in the left lower abdominal area, back, and pelvic pressure. She has experienced this problem for 3 days, since IUD insertion (7/15/25). She describes the pain as stabbing, cramping, and pressure and it occurs constantly. She rates her pain score as a 7/10 when at its worst. Patient notes aggravating factors include exertion, palpation, upright position, lying, urination, and bowel movements. Alleviating factors are voiding and Tylenol/motrin only helps a little.  The patient reports additional symptoms as none.  The patient has previously been evaluated for pelvic pain last night in the ER. The patient is not currently sexually active.    Did the patient have u/s today? Yes.  Findings showed IUCD placed correctly at the fundus of the uterus however end appears to extend into cervical canal. Right ovarian simple cyst 24.8x15.7x22mm.  I have reviewed the preliminary US report. Final report pending physician review. Elisabet Sood, APRN      No LMP recorded (lmp unknown). Patient has had an implant..  Periods are absent secondary to IUD and past depo provera injections    Problems with GI: no  History of urinary disease: no  Concern for Anxiety or Depression: yes  Exercises Regularly: yes  Tobacco Usage?: No     Additional OB/GYN History   Last Pap :   Last Completed Pap Smear            Upcoming       PAP SMEAR (Every 3 Years) Next due on 7/15/2028      07/15/2025  LIQUID-BASED PAP SMEAR WITH HPV GENOTYPING REGARDLESS OF INTERPRETATION ([facility])    2024  LIQUID-BASED PAP SMEAR WITH HPV GENOTYPING IF ASCUS ([facility])    2021  Done - [facility]                            OB History          0    Para   0    Term   0       0    AB   0    Living    0         SAB   0    IAB   0    Ectopic   0    Molar   0    Multiple   0    Live Births   0                  Current Outpatient Medications:     Ambien 10 MG tablet, , Disp: , Rfl:     dextroamphetamine (DEXTROSTAT) 10 MG tablet, , Disp: , Rfl:     escitalopram (Lexapro) 10 MG tablet, Take 1 tablet by mouth Daily., Disp: 90 tablet, Rfl: 1    QUEtiapine (SEROquel) 25 MG tablet, Take 1-2 tablets by mouth At Night As Needed., Disp: , Rfl:     topiramate (TOPAMAX) 50 MG tablet, TAKE 1 TABLET BY MOUTH EVERY DAY, Disp: 90 tablet, Rfl: 0    clonazePAM (KlonoPIN) 0.5 MG tablet, TAKE 1 AND 1/2 TABLETS BY MOUTH EVERY DAY AS NEEDED (Patient not taking: Reported on 7/18/2025), Disp: , Rfl:     medroxyPROGESTERone (DEPO-PROVERA) 150 MG/ML injection, Inject 1 mL into the appropriate muscle as directed by prescriber Every 3 (Three) Months., Disp: 1 mL, Rfl: 0    sodium-potassium-magnesium sulfates (Suprep Bowel Prep Kit) 17.5-3.13-1.6 GM/177ML solution oral solution, Take 1 bottle by mouth Take As Directed., Disp: 354 mL, Rfl: 0  No current facility-administered medications for this visit.     Past Medical History:   Diagnosis Date    Abnormal ECG 01/2024    abnormal p-waves; followed up with cardiology and all is well    ADHD (attention deficit hyperactivity disorder)     Allergic     Anxiety     Depression     History of abnormal cervical Pap smear     5/28/21: [facility]; patient reports no follow up since    History of concussion     thrown from horse multiple times (since childhood)    History of dysmenorrhea     History of hemorrhoids     History of lump of left breast     post-surgical    History of medical problems 2/7    Bilateral breast reduction    History of menorrhagia     History of obesity in adulthood     History of varicella     Hyperlipidemia     Low back pain     Migraine headache with aura     Obesity     Ovarian cyst     Overactive bladder 04/26/2022    has since cleared (urology followed, and no longer needs to  "follow up for it)    Sciatica, left side     Syringohydromyelia     T3-T12; congenital    Tachycardia 01/02/2024        Past Surgical History:   Procedure Laterality Date    BILATERAL BREAST REDUCTION Bilateral 02/07/2023    fat necrosis of left breast post-op; no follow-up surgical procedure needed    REDUCTION MAMMAPLASTY Bilateral 2023    WISDOM TOOTH EXTRACTION         The additional following portions of the patient's history were reviewed and updated as appropriate: allergies, current medications, past family history, past medical history, past social history, past surgical history, and problem list.      Review of Systems   Constitutional:  Positive for fatigue.   Respiratory: Negative.  Negative for shortness of breath.    Cardiovascular: Negative.  Negative for chest pain.   Gastrointestinal: Negative.  Negative for abdominal distention, abdominal pain and constipation.   Genitourinary:  Positive for pelvic pain, pelvic pressure and vaginal bleeding. Negative for dysuria, urinary incontinence, vaginal discharge and vaginal pain.   Skin: Negative.      All other systems reviewed and are negative.     I have reviewed and agree with the HPI, ROS, and historical information as entered above. Elisabet Sood, APRN      Objective   /70   Ht 70 cm (27.56\")   Wt 91.1 kg (200 lb 12.8 oz)   LMP  (LMP Unknown)   .88 kg/m²     Physical Exam  Vitals and nursing note reviewed. Exam conducted with a chaperone present.   Constitutional:       Appearance: Normal appearance.   Pulmonary:      Effort: Pulmonary effort is normal.   Abdominal:      General: There is no distension.      Palpations: Abdomen is soft. There is no mass.      Tenderness: There is abdominal tenderness. There is no guarding or rebound.      Hernia: No hernia is present.   Genitourinary:     General: Normal vulva.      Labia:         Right: No rash, tenderness, lesion or injury.         Left: No rash, tenderness, lesion or injury.      "  Vagina: No signs of injury. Vaginal discharge, erythema and bleeding present. No tenderness, lesions or prolapsed vaginal walls.      Cervix: Discharge, lesion, erythema and cervical bleeding present. No cervical motion tenderness or friability.      Uterus: Normal. Not enlarged and not tender.       Adnexa: Right adnexa normal and left adnexa normal.        Right: No mass, tenderness or fullness.          Left: No mass, tenderness or fullness.           Neurological:      Mental Status: She is alert and oriented to person, place, and time.   Psychiatric:         Mood and Affect: Mood normal.         Behavior: Behavior normal.         Assessment & Plan     Assessment and Plan    Problem List Items Addressed This Visit       Cyst of right ovary    Overview   7/18/2025- 24.8x15.7x22MM    Patient informed most ovarian cysts are typically not harmful, don’t cause symptoms, and are not indicative of risk for future ovarian cancer; however, malignancy cannot be ruled out with imaging alone. Malignancy can only be excluded with surgical removal.     Treatment options discussed including expectant management (low suspicion for malignancy), surveillance w/ f/u pelvic ultrasounds, and surgery if she desires histologic diagnosis or if she has persistent pain or other symptoms.    Expectant management. Rupture and torsion precautions reviewed. IBU, Tylenol, and heat PRN mild pain. Notify provider for worsening pain. ED for severe pain.             Other Visit Diagnoses         Pelvic pain    -  Primary    Relevant Orders    US Non-ob Transvaginal      Vaginal discharge        Relevant Orders    NuSwab Vaginitis (VG) - Swab, Vagina      Encounter for contraceptive management, unspecified type        Relevant Medications    medroxyPROGESTERone (DEPO-PROVERA) 150 MG/ML injection      Encounter for IUD removal                IUCD placed correctly, but appears to extend into cervical canal.   US findings discussed with   "You.  Treatment options offered: removal and either continuation of depo or consideration of kyleena or leave in place and see if it gets better with time. She desires IUD removal and depot injection for contraception. She states significant other is a provider and he was previously administering depot injections. Ok Per Dr Orta. Rx sent to pharmacy.   IUD removed (see procedure note).   Vaginal discharge- Nuswab pending. Will tx if indicated.   Ovarian cyst- see plan above.   Ovarian cyst, torsion, and depo education included in patient instructions.  Return in about 1 year (around 7/18/2026) for Annual physical or sooner if needed.    COMFORT Zhu  07/18/2025          Gynecologic Procedure Note        Procedure: IUD Removal     Procedures    Pre procedure indication     1) Desires Removal of IUD    Post procedure indication   1) Desires Removal of IUD    /70   Ht 70 cm (27.56\")   Wt 91.1 kg (200 lb 12.8 oz)   LMP  (LMP Unknown)   .88 kg/m²       The patient presents today for removal of her IUD.  She requests removal of her IUD due to pelvic pain. She plans to use Depo-Provera for contraception. All her questions were answered and consents were signed.    Time out: immediate members of the procedure team and patient agree to the following: correct patient, correct site, correct procedure to be performed. COMFORT Zhu        The patient was placed in a dorsal lithotomy position on the examining table in stirrups.  A speculum was inserted into the vagina and the cervix was brought into view.  An IUD string was visualized.  The string was then grasped and removed intact with gentle traction.  There was a Minimal amount of bleeding and cramping.    All  instruments were removed from the vagina.       The patient tolerated the procedure very well with a mild amount of discomfort.  She was monitored for 10 minutes prior to discharge.      There were no complications.    The " patient was counseled on other options for birth control. She plans to use Depo-Provera for contraception.     Problem List Items Addressed This Visit       Cyst of right ovary    Overview   7/18/2025- 24.8x15.7x22MM    Patient informed most ovarian cysts are typically not harmful, don’t cause symptoms, and are not indicative of risk for future ovarian cancer; however, malignancy cannot be ruled out with imaging alone. Malignancy can only be excluded with surgical removal.     Treatment options discussed including expectant management (low suspicion for malignancy), surveillance w/ f/u pelvic ultrasounds, and surgery if she desires histologic diagnosis or if she has persistent pain or other symptoms.    Expectant management. Rupture and torsion precautions reviewed. IBU, Tylenol, and heat PRN mild pain. Notify provider for worsening pain. ED for severe pain.             Other Visit Diagnoses         Pelvic pain    -  Primary    Relevant Orders    US Non-ob Transvaginal      Vaginal discharge        Relevant Orders    NuSwab Vaginitis (VG) - Swab, Vagina      Encounter for contraceptive management, unspecified type        Relevant Medications    medroxyPROGESTERone (DEPO-PROVERA) 150 MG/ML injection      Encounter for IUD removal                  Elisabet Sood, APRN  07/18/2025

## 2025-07-19 ENCOUNTER — PATIENT MESSAGE (OUTPATIENT)
Dept: OBSTETRICS AND GYNECOLOGY | Facility: CLINIC | Age: 30
End: 2025-07-19
Payer: COMMERCIAL

## 2025-07-20 PROBLEM — N83.201 CYST OF RIGHT OVARY: Status: ACTIVE | Noted: 2025-07-20

## 2025-07-20 RX ORDER — MEDROXYPROGESTERONE ACETATE 150 MG/ML
150 INJECTION, SUSPENSION INTRAMUSCULAR
Qty: 1 ML | Refills: 0 | Status: SHIPPED | OUTPATIENT
Start: 2025-07-20

## 2025-07-21 LAB
A VAGINAE DNA VAG QL NAA+PROBE: NORMAL SCORE
BVAB2 DNA VAG QL NAA+PROBE: NORMAL SCORE
C ALBICANS DNA VAG QL NAA+PROBE: NEGATIVE
C GLABRATA DNA VAG QL NAA+PROBE: NEGATIVE
MEGA1 DNA VAG QL NAA+PROBE: NORMAL SCORE
T VAGINALIS DNA VAG QL NAA+PROBE: NEGATIVE

## 2025-08-05 ENCOUNTER — TELEMEDICINE (OUTPATIENT)
Dept: FAMILY MEDICINE CLINIC | Facility: CLINIC | Age: 30
End: 2025-08-05
Payer: COMMERCIAL

## 2025-08-05 VITALS — BODY MASS INDEX: 28.81 KG/M2 | HEIGHT: 70 IN

## 2025-08-05 DIAGNOSIS — G95.0 SYRINGOHYDROMYELIA: ICD-10-CM

## 2025-08-05 DIAGNOSIS — G89.29 CHRONIC BILATERAL LOW BACK PAIN WITHOUT SCIATICA: Primary | ICD-10-CM

## 2025-08-05 DIAGNOSIS — M54.50 CHRONIC BILATERAL LOW BACK PAIN WITHOUT SCIATICA: Primary | ICD-10-CM

## 2025-08-05 PROCEDURE — 99214 OFFICE O/P EST MOD 30 MIN: CPT | Performed by: STUDENT IN AN ORGANIZED HEALTH CARE EDUCATION/TRAINING PROGRAM

## 2025-08-05 RX ORDER — LISDEXAMFETAMINE DIMESYLATE 50 MG/1
CAPSULE ORAL
COMMUNITY

## 2025-08-05 RX ORDER — BACLOFEN 10 MG/1
10 TABLET ORAL 3 TIMES DAILY PRN
Qty: 30 TABLET | Refills: 2 | Status: SHIPPED | OUTPATIENT
Start: 2025-08-05

## 2025-08-24 ENCOUNTER — HOSPITAL ENCOUNTER (OUTPATIENT)
Dept: MRI IMAGING | Facility: HOSPITAL | Age: 30
Discharge: HOME | End: 2025-08-24
Admitting: STUDENT IN AN ORGANIZED HEALTH CARE EDUCATION/TRAINING PROGRAM
Payer: COMMERCIAL

## 2025-08-24 DIAGNOSIS — G95.0 SYRINGOHYDROMYELIA: ICD-10-CM

## 2025-08-24 PROCEDURE — 72146 MRI CHEST SPINE W/O DYE: CPT
